# Patient Record
Sex: FEMALE | Race: WHITE | Employment: OTHER | URBAN - METROPOLITAN AREA
[De-identification: names, ages, dates, MRNs, and addresses within clinical notes are randomized per-mention and may not be internally consistent; named-entity substitution may affect disease eponyms.]

---

## 2018-04-23 ENCOUNTER — APPOINTMENT (OUTPATIENT)
Dept: GENERAL RADIOLOGY | Age: 83
DRG: 563 | End: 2018-04-23
Payer: MEDICARE

## 2018-04-23 ENCOUNTER — HOSPITAL ENCOUNTER (INPATIENT)
Age: 83
LOS: 3 days | Discharge: NURSING, REHAB OR LONG-TERM CARE | DRG: 563 | End: 2018-04-26
Attending: EMERGENCY MEDICINE | Admitting: FAMILY MEDICINE
Payer: MEDICARE

## 2018-04-23 ENCOUNTER — APPOINTMENT (OUTPATIENT)
Dept: CT IMAGING | Age: 83
DRG: 563 | End: 2018-04-23
Payer: MEDICARE
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
## 2018-04-23 DIAGNOSIS — T79.6XXA TRAUMATIC RHABDOMYOLYSIS, INITIAL ENCOUNTER (HCC): ICD-10-CM

## 2018-04-23 DIAGNOSIS — S32.010A CLOSED COMPRESSION FRACTURE OF FIRST LUMBAR VERTEBRA, INITIAL ENCOUNTER: ICD-10-CM

## 2018-04-23 LAB
ALBUMIN SERPL-MCNC: 3.7 G/DL (ref 3.5–5.2)
ALP BLD-CCNC: 32 U/L (ref 35–104)
ALT SERPL-CCNC: 21 U/L (ref 0–32)
ANION GAP SERPL CALCULATED.3IONS-SCNC: 11 MMOL/L (ref 7–16)
AST SERPL-CCNC: 63 U/L (ref 0–31)
BACTERIA: NORMAL /HPF
BILIRUB SERPL-MCNC: 1.1 MG/DL (ref 0–1.2)
BILIRUBIN URINE: NEGATIVE
BLOOD, URINE: ABNORMAL
BUN BLDV-MCNC: 25 MG/DL (ref 8–23)
CALCIUM SERPL-MCNC: 8.9 MG/DL (ref 8.6–10.2)
CHLORIDE BLD-SCNC: 101 MMOL/L (ref 98–107)
CLARITY: CLEAR
CO2: 24 MMOL/L (ref 22–29)
COLOR: YELLOW
CREAT SERPL-MCNC: 0.6 MG/DL (ref 0.5–1)
GFR AFRICAN AMERICAN: >60
GFR NON-AFRICAN AMERICAN: >60 ML/MIN/1.73
GLUCOSE BLD-MCNC: 111 MG/DL (ref 74–109)
GLUCOSE URINE: NEGATIVE MG/DL
HCT VFR BLD CALC: 39 % (ref 34–48)
HEMOGLOBIN: 12.6 G/DL (ref 11.5–15.5)
KETONES, URINE: 40 MG/DL
LACTIC ACID: 1.4 MMOL/L (ref 0.5–2.2)
LEUKOCYTE ESTERASE, URINE: NEGATIVE
MCH RBC QN AUTO: 30.7 PG (ref 26–35)
MCHC RBC AUTO-ENTMCNC: 32.3 % (ref 32–34.5)
MCV RBC AUTO: 94.9 FL (ref 80–99.9)
NITRITE, URINE: NEGATIVE
PDW BLD-RTO: 12.5 FL (ref 11.5–15)
PH UA: 7 (ref 5–9)
PLATELET # BLD: 126 E9/L (ref 130–450)
PMV BLD AUTO: 10 FL (ref 7–12)
POTASSIUM SERPL-SCNC: 4.6 MMOL/L (ref 3.5–5)
POTASSIUM SERPL-SCNC: 5.8 MMOL/L (ref 3.5–5)
PROTEIN UA: 30 MG/DL
RBC # BLD: 4.11 E12/L (ref 3.5–5.5)
RBC UA: NORMAL /HPF (ref 0–2)
SODIUM BLD-SCNC: 136 MMOL/L (ref 132–146)
SPECIFIC GRAVITY UA: 1.02 (ref 1–1.03)
TOTAL CK: 723 U/L (ref 20–180)
TOTAL PROTEIN: 6.8 G/DL (ref 6.4–8.3)
TROPONIN: 0.26 NG/ML (ref 0–0.03)
TROPONIN: 0.35 NG/ML (ref 0–0.03)
TROPONIN: 0.41 NG/ML (ref 0–0.03)
UROBILINOGEN, URINE: 0.2 E.U./DL
WBC # BLD: 11.6 E9/L (ref 4.5–11.5)
WBC UA: NORMAL /HPF (ref 0–5)

## 2018-04-23 PROCEDURE — 2580000003 HC RX 258: Performed by: FAMILY MEDICINE

## 2018-04-23 PROCEDURE — 93005 ELECTROCARDIOGRAM TRACING: CPT | Performed by: EMERGENCY MEDICINE

## 2018-04-23 PROCEDURE — 72125 CT NECK SPINE W/O DYE: CPT

## 2018-04-23 PROCEDURE — 94760 N-INVAS EAR/PLS OXIMETRY 1: CPT

## 2018-04-23 PROCEDURE — 2500000003 HC RX 250 WO HCPCS: Performed by: FAMILY MEDICINE

## 2018-04-23 PROCEDURE — 80053 COMPREHEN METABOLIC PANEL: CPT

## 2018-04-23 PROCEDURE — 82550 ASSAY OF CK (CPK): CPT

## 2018-04-23 PROCEDURE — 83605 ASSAY OF LACTIC ACID: CPT

## 2018-04-23 PROCEDURE — 2140000000 HC CCU INTERMEDIATE R&B

## 2018-04-23 PROCEDURE — 71250 CT THORAX DX C-: CPT

## 2018-04-23 PROCEDURE — 70450 CT HEAD/BRAIN W/O DYE: CPT

## 2018-04-23 PROCEDURE — 2580000003 HC RX 258: Performed by: EMERGENCY MEDICINE

## 2018-04-23 PROCEDURE — 99285 EMERGENCY DEPT VISIT HI MDM: CPT

## 2018-04-23 PROCEDURE — 72131 CT LUMBAR SPINE W/O DYE: CPT

## 2018-04-23 PROCEDURE — 6370000000 HC RX 637 (ALT 250 FOR IP): Performed by: EMERGENCY MEDICINE

## 2018-04-23 PROCEDURE — 73564 X-RAY EXAM KNEE 4 OR MORE: CPT

## 2018-04-23 PROCEDURE — 72128 CT CHEST SPINE W/O DYE: CPT

## 2018-04-23 PROCEDURE — 87088 URINE BACTERIA CULTURE: CPT

## 2018-04-23 PROCEDURE — 36415 COLL VENOUS BLD VENIPUNCTURE: CPT

## 2018-04-23 PROCEDURE — 73502 X-RAY EXAM HIP UNI 2-3 VIEWS: CPT

## 2018-04-23 PROCEDURE — 6360000002 HC RX W HCPCS: Performed by: FAMILY MEDICINE

## 2018-04-23 PROCEDURE — 84484 ASSAY OF TROPONIN QUANT: CPT

## 2018-04-23 PROCEDURE — 81001 URINALYSIS AUTO W/SCOPE: CPT

## 2018-04-23 PROCEDURE — 73110 X-RAY EXAM OF WRIST: CPT

## 2018-04-23 PROCEDURE — 74176 CT ABD & PELVIS W/O CONTRAST: CPT

## 2018-04-23 PROCEDURE — 85027 COMPLETE CBC AUTOMATED: CPT

## 2018-04-23 PROCEDURE — 84132 ASSAY OF SERUM POTASSIUM: CPT

## 2018-04-23 RX ORDER — SERTRALINE HYDROCHLORIDE 25 MG/1
12.5 TABLET, FILM COATED ORAL EVERY OTHER DAY
Refills: 5 | COMMUNITY
Start: 2018-01-29

## 2018-04-23 RX ORDER — SODIUM CHLORIDE 0.9 % (FLUSH) 0.9 %
10 SYRINGE (ML) INJECTION PRN
Status: DISCONTINUED | OUTPATIENT
Start: 2018-04-23 | End: 2018-04-26 | Stop reason: HOSPADM

## 2018-04-23 RX ORDER — 0.9 % SODIUM CHLORIDE 0.9 %
500 INTRAVENOUS SOLUTION INTRAVENOUS ONCE
Status: COMPLETED | OUTPATIENT
Start: 2018-04-23 | End: 2018-04-23

## 2018-04-23 RX ORDER — LIDOCAINE 50 MG/G
1 PATCH TOPICAL PRN
Status: DISCONTINUED | OUTPATIENT
Start: 2018-04-23 | End: 2018-04-23

## 2018-04-23 RX ORDER — ALPRAZOLAM 0.25 MG/1
0.25 TABLET ORAL 3 TIMES DAILY PRN
Status: DISCONTINUED | OUTPATIENT
Start: 2018-04-23 | End: 2018-04-26 | Stop reason: HOSPADM

## 2018-04-23 RX ORDER — ACETAMINOPHEN 325 MG/1
650 TABLET ORAL EVERY 4 HOURS PRN
Status: DISCONTINUED | OUTPATIENT
Start: 2018-04-23 | End: 2018-04-23 | Stop reason: SDUPTHER

## 2018-04-23 RX ORDER — HYDROCODONE BITARTRATE AND ACETAMINOPHEN 5; 325 MG/1; MG/1
2 TABLET ORAL EVERY 4 HOURS PRN
Status: DISCONTINUED | OUTPATIENT
Start: 2018-04-23 | End: 2018-04-25

## 2018-04-23 RX ORDER — MECLIZINE HCL 12.5 MG/1
25 TABLET ORAL 3 TIMES DAILY PRN
Status: DISCONTINUED | OUTPATIENT
Start: 2018-04-23 | End: 2018-04-26 | Stop reason: HOSPADM

## 2018-04-23 RX ORDER — SODIUM CHLORIDE 0.9 % (FLUSH) 0.9 %
10 SYRINGE (ML) INJECTION EVERY 12 HOURS SCHEDULED
Status: DISCONTINUED | OUTPATIENT
Start: 2018-04-23 | End: 2018-04-26 | Stop reason: HOSPADM

## 2018-04-23 RX ORDER — ACETAMINOPHEN 325 MG/1
325 TABLET ORAL EVERY 6 HOURS PRN
Status: DISCONTINUED | OUTPATIENT
Start: 2018-04-23 | End: 2018-04-25

## 2018-04-23 RX ORDER — LIDOCAINE 50 MG/G
1 PATCH TOPICAL DAILY PRN
Status: DISCONTINUED | OUTPATIENT
Start: 2018-04-23 | End: 2018-04-26 | Stop reason: HOSPADM

## 2018-04-23 RX ORDER — HYDROCODONE BITARTRATE AND ACETAMINOPHEN 5; 325 MG/1; MG/1
1 TABLET ORAL EVERY 4 HOURS PRN
Status: DISCONTINUED | OUTPATIENT
Start: 2018-04-23 | End: 2018-04-25

## 2018-04-23 RX ORDER — DEXTROSE, SODIUM CHLORIDE, AND POTASSIUM CHLORIDE 5; .45; .15 G/100ML; G/100ML; G/100ML
INJECTION INTRAVENOUS CONTINUOUS
Status: DISCONTINUED | OUTPATIENT
Start: 2018-04-23 | End: 2018-04-26

## 2018-04-23 RX ORDER — ONDANSETRON 2 MG/ML
4 INJECTION INTRAMUSCULAR; INTRAVENOUS EVERY 6 HOURS PRN
Status: DISCONTINUED | OUTPATIENT
Start: 2018-04-23 | End: 2018-04-26 | Stop reason: HOSPADM

## 2018-04-23 RX ORDER — ASPIRIN 325 MG
325 TABLET ORAL ONCE
Status: COMPLETED | OUTPATIENT
Start: 2018-04-23 | End: 2018-04-23

## 2018-04-23 RX ADMIN — SODIUM CHLORIDE 500 ML: 9 INJECTION, SOLUTION INTRAVENOUS at 12:11

## 2018-04-23 RX ADMIN — ENOXAPARIN SODIUM 40 MG: 40 INJECTION SUBCUTANEOUS at 18:22

## 2018-04-23 RX ADMIN — DEXTROSE MONOHYDRATE, SODIUM CHLORIDE, AND POTASSIUM CHLORIDE: 50; 4.5; 1.49 INJECTION, SOLUTION INTRAVENOUS at 18:22

## 2018-04-23 RX ADMIN — ASPIRIN 325 MG: 325 TABLET, COATED ORAL at 15:21

## 2018-04-23 RX ADMIN — SODIUM CHLORIDE 500 ML: 9 INJECTION, SOLUTION INTRAVENOUS at 15:20

## 2018-04-23 ASSESSMENT — PAIN SCALES - GENERAL
PAINLEVEL_OUTOF10: 0
PAINLEVEL_OUTOF10: 0
PAINLEVEL_OUTOF10: 10
PAINLEVEL_OUTOF10: 0

## 2018-04-23 ASSESSMENT — PAIN DESCRIPTION - PAIN TYPE: TYPE: ACUTE PAIN;CHRONIC PAIN

## 2018-04-23 ASSESSMENT — PAIN DESCRIPTION - ORIENTATION: ORIENTATION: LOWER

## 2018-04-23 ASSESSMENT — PAIN DESCRIPTION - LOCATION: LOCATION: BACK

## 2018-04-24 ENCOUNTER — APPOINTMENT (OUTPATIENT)
Dept: MRI IMAGING | Age: 83
DRG: 563 | End: 2018-04-24
Payer: MEDICARE

## 2018-04-24 LAB
ALBUMIN SERPL-MCNC: 2.8 G/DL (ref 3.5–5.2)
ALP BLD-CCNC: 35 U/L (ref 35–104)
ALT SERPL-CCNC: 17 U/L (ref 0–32)
ANION GAP SERPL CALCULATED.3IONS-SCNC: 10 MMOL/L (ref 7–16)
AST SERPL-CCNC: 40 U/L (ref 0–31)
BILIRUB SERPL-MCNC: 0.7 MG/DL (ref 0–1.2)
BUN BLDV-MCNC: 23 MG/DL (ref 8–23)
CALCIUM SERPL-MCNC: 8.1 MG/DL (ref 8.6–10.2)
CHLORIDE BLD-SCNC: 105 MMOL/L (ref 98–107)
CO2: 22 MMOL/L (ref 22–29)
CREAT SERPL-MCNC: 0.6 MG/DL (ref 0.5–1)
GFR AFRICAN AMERICAN: >60
GFR NON-AFRICAN AMERICAN: >60 ML/MIN/1.73
GLUCOSE BLD-MCNC: 101 MG/DL (ref 74–109)
HCT VFR BLD CALC: 32.8 % (ref 34–48)
HEMOGLOBIN: 10.7 G/DL (ref 11.5–15.5)
LV EF: 60 %
LVEF MODALITY: NORMAL
MCH RBC QN AUTO: 31.1 PG (ref 26–35)
MCHC RBC AUTO-ENTMCNC: 32.6 % (ref 32–34.5)
MCV RBC AUTO: 95.3 FL (ref 80–99.9)
PDW BLD-RTO: 12.6 FL (ref 11.5–15)
PLATELET # BLD: 101 E9/L (ref 130–450)
PMV BLD AUTO: 10.6 FL (ref 7–12)
POTASSIUM REFLEX MAGNESIUM: 4 MMOL/L (ref 3.5–5)
RBC # BLD: 3.44 E12/L (ref 3.5–5.5)
SODIUM BLD-SCNC: 137 MMOL/L (ref 132–146)
TOTAL CK: 436 U/L (ref 20–180)
TOTAL PROTEIN: 5.5 G/DL (ref 6.4–8.3)
TROPONIN: 0.33 NG/ML (ref 0–0.03)
WBC # BLD: 9.6 E9/L (ref 4.5–11.5)

## 2018-04-24 PROCEDURE — 80053 COMPREHEN METABOLIC PANEL: CPT

## 2018-04-24 PROCEDURE — 85027 COMPLETE CBC AUTOMATED: CPT

## 2018-04-24 PROCEDURE — 2140000000 HC CCU INTERMEDIATE R&B

## 2018-04-24 PROCEDURE — APPSS45 APP SPLIT SHARED TIME 31-45 MINUTES: Performed by: PHYSICIAN ASSISTANT

## 2018-04-24 PROCEDURE — 93306 TTE W/DOPPLER COMPLETE: CPT

## 2018-04-24 PROCEDURE — 2500000003 HC RX 250 WO HCPCS: Performed by: FAMILY MEDICINE

## 2018-04-24 PROCEDURE — 72148 MRI LUMBAR SPINE W/O DYE: CPT

## 2018-04-24 PROCEDURE — 6370000000 HC RX 637 (ALT 250 FOR IP): Performed by: FAMILY MEDICINE

## 2018-04-24 PROCEDURE — 2580000003 HC RX 258: Performed by: FAMILY MEDICINE

## 2018-04-24 PROCEDURE — 84484 ASSAY OF TROPONIN QUANT: CPT

## 2018-04-24 PROCEDURE — 6360000002 HC RX W HCPCS: Performed by: FAMILY MEDICINE

## 2018-04-24 PROCEDURE — 99223 1ST HOSP IP/OBS HIGH 75: CPT | Performed by: INTERNAL MEDICINE

## 2018-04-24 PROCEDURE — 82550 ASSAY OF CK (CPK): CPT

## 2018-04-24 PROCEDURE — 36415 COLL VENOUS BLD VENIPUNCTURE: CPT

## 2018-04-24 RX ORDER — POLYVINYL ALCOHOL 14 MG/ML
1 SOLUTION/ DROPS OPHTHALMIC EVERY 4 HOURS PRN
Status: DISCONTINUED | OUTPATIENT
Start: 2018-04-24 | End: 2018-04-26 | Stop reason: HOSPADM

## 2018-04-24 RX ORDER — LORAZEPAM 2 MG/ML
0.5 INJECTION INTRAMUSCULAR ONCE
Status: DISCONTINUED | OUTPATIENT
Start: 2018-04-24 | End: 2018-04-26 | Stop reason: HOSPADM

## 2018-04-24 RX ADMIN — DEXTROSE MONOHYDRATE, SODIUM CHLORIDE, AND POTASSIUM CHLORIDE: 50; 4.5; 1.49 INJECTION, SOLUTION INTRAVENOUS at 22:48

## 2018-04-24 RX ADMIN — HYDROCODONE BITARTRATE AND ACETAMINOPHEN 1 TABLET: 5; 325 TABLET ORAL at 18:16

## 2018-04-24 RX ADMIN — ENOXAPARIN SODIUM 40 MG: 40 INJECTION SUBCUTANEOUS at 09:24

## 2018-04-24 RX ADMIN — ACETAMINOPHEN 325 MG: 325 TABLET, FILM COATED ORAL at 09:23

## 2018-04-24 RX ADMIN — ALPRAZOLAM 0.25 MG: 0.25 TABLET ORAL at 09:31

## 2018-04-24 RX ADMIN — Medication 10 ML: at 21:53

## 2018-04-24 RX ADMIN — DEXTROSE MONOHYDRATE, SODIUM CHLORIDE, AND POTASSIUM CHLORIDE: 50; 4.5; 1.49 INJECTION, SOLUTION INTRAVENOUS at 06:16

## 2018-04-24 RX ADMIN — Medication 10 ML: at 09:24

## 2018-04-24 RX ADMIN — Medication 10 ML: at 05:32

## 2018-04-24 ASSESSMENT — PAIN DESCRIPTION - PAIN TYPE: TYPE: ACUTE PAIN

## 2018-04-24 ASSESSMENT — PAIN DESCRIPTION - ORIENTATION: ORIENTATION: LOWER

## 2018-04-24 ASSESSMENT — PAIN DESCRIPTION - DESCRIPTORS: DESCRIPTORS: ACHING

## 2018-04-24 ASSESSMENT — PAIN SCALES - GENERAL
PAINLEVEL_OUTOF10: 5
PAINLEVEL_OUTOF10: 0
PAINLEVEL_OUTOF10: 0
PAINLEVEL_OUTOF10: 8
PAINLEVEL_OUTOF10: 6

## 2018-04-24 ASSESSMENT — PAIN DESCRIPTION - LOCATION: LOCATION: BACK

## 2018-04-25 PROBLEM — S32.010D CLOSED COMPRESSION FRACTURE OF L1 LUMBAR VERTEBRA, WITH ROUTINE HEALING, SUBSEQUENT ENCOUNTER: Chronic | Status: ACTIVE | Noted: 2018-04-23

## 2018-04-25 PROBLEM — S39.012A LUMBOSACRAL STRAIN: Status: ACTIVE | Noted: 2018-04-25

## 2018-04-25 LAB
ANION GAP SERPL CALCULATED.3IONS-SCNC: 9 MMOL/L (ref 7–16)
BUN BLDV-MCNC: 15 MG/DL (ref 8–23)
CALCIUM SERPL-MCNC: 8 MG/DL (ref 8.6–10.2)
CHLORIDE BLD-SCNC: 105 MMOL/L (ref 98–107)
CO2: 23 MMOL/L (ref 22–29)
CREAT SERPL-MCNC: 0.6 MG/DL (ref 0.5–1)
GFR AFRICAN AMERICAN: >60
GFR NON-AFRICAN AMERICAN: >60 ML/MIN/1.73
GLUCOSE BLD-MCNC: 111 MG/DL (ref 74–109)
POTASSIUM SERPL-SCNC: 4 MMOL/L (ref 3.5–5)
SODIUM BLD-SCNC: 137 MMOL/L (ref 132–146)
TOTAL CK: 131 U/L (ref 20–180)
URINE CULTURE, ROUTINE: NORMAL

## 2018-04-25 PROCEDURE — 2700000000 HC OXYGEN THERAPY PER DAY

## 2018-04-25 PROCEDURE — 6370000000 HC RX 637 (ALT 250 FOR IP): Performed by: FAMILY MEDICINE

## 2018-04-25 PROCEDURE — 99232 SBSQ HOSP IP/OBS MODERATE 35: CPT | Performed by: INTERNAL MEDICINE

## 2018-04-25 PROCEDURE — 97162 PT EVAL MOD COMPLEX 30 MIN: CPT

## 2018-04-25 PROCEDURE — G8978 MOBILITY CURRENT STATUS: HCPCS

## 2018-04-25 PROCEDURE — G8987 SELF CARE CURRENT STATUS: HCPCS

## 2018-04-25 PROCEDURE — 97530 THERAPEUTIC ACTIVITIES: CPT

## 2018-04-25 PROCEDURE — 82550 ASSAY OF CK (CPK): CPT

## 2018-04-25 PROCEDURE — 2140000000 HC CCU INTERMEDIATE R&B

## 2018-04-25 PROCEDURE — G8979 MOBILITY GOAL STATUS: HCPCS

## 2018-04-25 PROCEDURE — G8988 SELF CARE GOAL STATUS: HCPCS

## 2018-04-25 PROCEDURE — 80048 BASIC METABOLIC PNL TOTAL CA: CPT

## 2018-04-25 PROCEDURE — 97165 OT EVAL LOW COMPLEX 30 MIN: CPT

## 2018-04-25 PROCEDURE — 36415 COLL VENOUS BLD VENIPUNCTURE: CPT

## 2018-04-25 PROCEDURE — 6360000002 HC RX W HCPCS: Performed by: FAMILY MEDICINE

## 2018-04-25 RX ORDER — ACETAMINOPHEN 325 MG/1
650 TABLET ORAL EVERY 6 HOURS PRN
Status: DISCONTINUED | OUTPATIENT
Start: 2018-04-25 | End: 2018-04-26 | Stop reason: HOSPADM

## 2018-04-25 RX ADMIN — ENOXAPARIN SODIUM 40 MG: 40 INJECTION SUBCUTANEOUS at 08:47

## 2018-04-25 RX ADMIN — ACETAMINOPHEN 650 MG: 325 TABLET, FILM COATED ORAL at 08:49

## 2018-04-25 RX ADMIN — HYDROCODONE BITARTRATE AND ACETAMINOPHEN 1 TABLET: 5; 325 TABLET ORAL at 04:23

## 2018-04-25 RX ADMIN — ACETAMINOPHEN 650 MG: 325 TABLET, FILM COATED ORAL at 16:07

## 2018-04-25 ASSESSMENT — PAIN DESCRIPTION - DESCRIPTORS: DESCRIPTORS: ACHING

## 2018-04-25 ASSESSMENT — PAIN DESCRIPTION - PROGRESSION
CLINICAL_PROGRESSION: GRADUALLY IMPROVING
CLINICAL_PROGRESSION: GRADUALLY IMPROVING

## 2018-04-25 ASSESSMENT — PAIN SCALES - GENERAL
PAINLEVEL_OUTOF10: 6
PAINLEVEL_OUTOF10: 6
PAINLEVEL_OUTOF10: 4
PAINLEVEL_OUTOF10: 6
PAINLEVEL_OUTOF10: 8

## 2018-04-25 ASSESSMENT — PAIN DESCRIPTION - LOCATION: LOCATION: BACK

## 2018-04-25 ASSESSMENT — PAIN DESCRIPTION - ORIENTATION: ORIENTATION: LOWER

## 2018-04-25 ASSESSMENT — PAIN DESCRIPTION - ONSET: ONSET: ON-GOING

## 2018-04-25 ASSESSMENT — PAIN DESCRIPTION - PAIN TYPE: TYPE: ACUTE PAIN

## 2018-04-25 ASSESSMENT — PAIN DESCRIPTION - FREQUENCY: FREQUENCY: INTERMITTENT

## 2018-04-26 VITALS
RESPIRATION RATE: 18 BRPM | TEMPERATURE: 98.2 F | DIASTOLIC BLOOD PRESSURE: 48 MMHG | SYSTOLIC BLOOD PRESSURE: 108 MMHG | WEIGHT: 112.9 LBS | HEART RATE: 80 BPM | OXYGEN SATURATION: 96 % | HEIGHT: 60 IN | BODY MASS INDEX: 22.16 KG/M2

## 2018-04-26 PROCEDURE — 6360000002 HC RX W HCPCS: Performed by: FAMILY MEDICINE

## 2018-04-26 PROCEDURE — 6370000000 HC RX 637 (ALT 250 FOR IP): Performed by: FAMILY MEDICINE

## 2018-04-26 PROCEDURE — L0464 TLSO 4MOD SACRO-SCAP PRE: HCPCS

## 2018-04-26 PROCEDURE — 2500000003 HC RX 250 WO HCPCS: Performed by: FAMILY MEDICINE

## 2018-04-26 PROCEDURE — 97530 THERAPEUTIC ACTIVITIES: CPT

## 2018-04-26 PROCEDURE — 2580000003 HC RX 258: Performed by: FAMILY MEDICINE

## 2018-04-26 RX ORDER — POLYVINYL ALCOHOL 14 MG/ML
1 SOLUTION/ DROPS OPHTHALMIC EVERY 4 HOURS PRN
Qty: 1 BOTTLE | Refills: 4 | Status: SHIPPED | OUTPATIENT
Start: 2018-04-26 | End: 2018-05-26

## 2018-04-26 RX ORDER — ACETAMINOPHEN 325 MG/1
325 TABLET ORAL EVERY 4 HOURS PRN
Qty: 120 TABLET | Refills: 3 | Status: SHIPPED | OUTPATIENT
Start: 2018-04-26

## 2018-04-26 RX ADMIN — ENOXAPARIN SODIUM 40 MG: 40 INJECTION SUBCUTANEOUS at 09:19

## 2018-04-26 RX ADMIN — ACETAMINOPHEN 650 MG: 325 TABLET, FILM COATED ORAL at 03:02

## 2018-04-26 RX ADMIN — DEXTROSE MONOHYDRATE, SODIUM CHLORIDE, AND POTASSIUM CHLORIDE: 50; 4.5; 1.49 INJECTION, SOLUTION INTRAVENOUS at 00:15

## 2018-04-26 RX ADMIN — Medication 10 ML: at 09:19

## 2018-04-26 ASSESSMENT — PAIN SCALES - GENERAL
PAINLEVEL_OUTOF10: 8
PAINLEVEL_OUTOF10: 0

## 2018-04-27 LAB
EKG ATRIAL RATE: 81 BPM
EKG P AXIS: 56 DEGREES
EKG P-R INTERVAL: 106 MS
EKG Q-T INTERVAL: 396 MS
EKG QRS DURATION: 82 MS
EKG QTC CALCULATION (BAZETT): 460 MS
EKG R AXIS: 76 DEGREES
EKG T AXIS: 82 DEGREES
EKG VENTRICULAR RATE: 81 BPM
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
## 2018-07-13 ENCOUNTER — APPOINTMENT (OUTPATIENT)
Dept: GENERAL RADIOLOGY | Age: 83
DRG: 884 | End: 2018-07-13
Payer: MEDICARE

## 2018-07-13 ENCOUNTER — APPOINTMENT (OUTPATIENT)
Dept: CT IMAGING | Age: 83
DRG: 884 | End: 2018-07-13
Payer: MEDICARE

## 2018-07-13 ENCOUNTER — HOSPITAL ENCOUNTER (INPATIENT)
Age: 83
LOS: 7 days | Discharge: NURSING, REHAB OR LONG-TERM CARE | DRG: 884 | End: 2018-07-20
Attending: EMERGENCY MEDICINE | Admitting: FAMILY MEDICINE
Payer: MEDICARE

## 2018-07-13 DIAGNOSIS — R53.83 FATIGUE, UNSPECIFIED TYPE: ICD-10-CM

## 2018-07-13 DIAGNOSIS — R53.1 GENERALIZED WEAKNESS: Primary | ICD-10-CM

## 2018-07-13 LAB
ALBUMIN SERPL-MCNC: 3.3 G/DL (ref 3.5–5.2)
ALP BLD-CCNC: 49 U/L (ref 35–104)
ALT SERPL-CCNC: 11 U/L (ref 0–32)
ANION GAP SERPL CALCULATED.3IONS-SCNC: 10 MMOL/L (ref 7–16)
APTT: 28.5 SEC (ref 24.5–35.1)
AST SERPL-CCNC: 40 U/L (ref 0–31)
BACTERIA: NORMAL /HPF
BASOPHILS ABSOLUTE: 0.02 E9/L (ref 0–0.2)
BASOPHILS RELATIVE PERCENT: 0.2 % (ref 0–2)
BILIRUB SERPL-MCNC: 0.3 MG/DL (ref 0–1.2)
BILIRUBIN URINE: NEGATIVE
BLOOD, URINE: NEGATIVE
BUN BLDV-MCNC: 23 MG/DL (ref 8–23)
CALCIUM SERPL-MCNC: 8.8 MG/DL (ref 8.6–10.2)
CHLORIDE BLD-SCNC: 104 MMOL/L (ref 98–107)
CLARITY: CLEAR
CO2: 25 MMOL/L (ref 22–29)
COLOR: YELLOW
CREAT SERPL-MCNC: 0.6 MG/DL (ref 0.5–1)
EKG ATRIAL RATE: 82 BPM
EKG P AXIS: 42 DEGREES
EKG P-R INTERVAL: 142 MS
EKG Q-T INTERVAL: 300 MS
EKG QRS DURATION: 76 MS
EKG QTC CALCULATION (BAZETT): 350 MS
EKG R AXIS: 21 DEGREES
EKG T AXIS: 74 DEGREES
EKG VENTRICULAR RATE: 82 BPM
EOSINOPHILS ABSOLUTE: 0.1 E9/L (ref 0.05–0.5)
EOSINOPHILS RELATIVE PERCENT: 1.2 % (ref 0–6)
GFR AFRICAN AMERICAN: >60
GFR NON-AFRICAN AMERICAN: >60 ML/MIN/1.73
GLUCOSE BLD-MCNC: 100 MG/DL (ref 74–109)
GLUCOSE URINE: NEGATIVE MG/DL
HCT VFR BLD CALC: 32.9 % (ref 34–48)
HEMOGLOBIN: 11 G/DL (ref 11.5–15.5)
IMMATURE GRANULOCYTES #: 0.05 E9/L
IMMATURE GRANULOCYTES %: 0.6 % (ref 0–5)
INR BLD: 1.1
KETONES, URINE: NEGATIVE MG/DL
LACTIC ACID: 0.8 MMOL/L (ref 0.5–2.2)
LEUKOCYTE ESTERASE, URINE: NEGATIVE
LYMPHOCYTES ABSOLUTE: 1.91 E9/L (ref 1.5–4)
LYMPHOCYTES RELATIVE PERCENT: 22 % (ref 20–42)
MCH RBC QN AUTO: 31.7 PG (ref 26–35)
MCHC RBC AUTO-ENTMCNC: 33.4 % (ref 32–34.5)
MCV RBC AUTO: 94.8 FL (ref 80–99.9)
MONOCYTES ABSOLUTE: 0.95 E9/L (ref 0.1–0.95)
MONOCYTES RELATIVE PERCENT: 11 % (ref 2–12)
NEUTROPHILS ABSOLUTE: 5.64 E9/L (ref 1.8–7.3)
NEUTROPHILS RELATIVE PERCENT: 65 % (ref 43–80)
NITRITE, URINE: NEGATIVE
PDW BLD-RTO: 13.6 FL (ref 11.5–15)
PH UA: 8 (ref 5–9)
PLATELET # BLD: 153 E9/L (ref 130–450)
PMV BLD AUTO: 9.7 FL (ref 7–12)
POTASSIUM SERPL-SCNC: 5.1 MMOL/L (ref 3.5–5)
POTASSIUM SERPL-SCNC: 5.5 MMOL/L (ref 3.5–5)
PRO-BNP: 927 PG/ML (ref 0–450)
PROTEIN UA: NEGATIVE MG/DL
PROTHROMBIN TIME: 12.6 SEC (ref 9.3–12.4)
RBC # BLD: 3.47 E12/L (ref 3.5–5.5)
RBC UA: NORMAL /HPF (ref 0–2)
SODIUM BLD-SCNC: 139 MMOL/L (ref 132–146)
SPECIFIC GRAVITY UA: 1.01 (ref 1–1.03)
TOTAL PROTEIN: 6.8 G/DL (ref 6.4–8.3)
TROPONIN: <0.01 NG/ML (ref 0–0.03)
UROBILINOGEN, URINE: 0.2 E.U./DL
WBC # BLD: 8.7 E9/L (ref 4.5–11.5)
WBC UA: NORMAL /HPF (ref 0–5)

## 2018-07-13 PROCEDURE — 99284 EMERGENCY DEPT VISIT MOD MDM: CPT

## 2018-07-13 PROCEDURE — 85025 COMPLETE CBC W/AUTO DIFF WBC: CPT

## 2018-07-13 PROCEDURE — 83605 ASSAY OF LACTIC ACID: CPT

## 2018-07-13 PROCEDURE — 93005 ELECTROCARDIOGRAM TRACING: CPT | Performed by: EMERGENCY MEDICINE

## 2018-07-13 PROCEDURE — 85610 PROTHROMBIN TIME: CPT

## 2018-07-13 PROCEDURE — 70450 CT HEAD/BRAIN W/O DYE: CPT

## 2018-07-13 PROCEDURE — 85730 THROMBOPLASTIN TIME PARTIAL: CPT

## 2018-07-13 PROCEDURE — 6360000002 HC RX W HCPCS: Performed by: EMERGENCY MEDICINE

## 2018-07-13 PROCEDURE — 36415 COLL VENOUS BLD VENIPUNCTURE: CPT

## 2018-07-13 PROCEDURE — 71045 X-RAY EXAM CHEST 1 VIEW: CPT

## 2018-07-13 PROCEDURE — 84484 ASSAY OF TROPONIN QUANT: CPT

## 2018-07-13 PROCEDURE — 84132 ASSAY OF SERUM POTASSIUM: CPT

## 2018-07-13 PROCEDURE — 96374 THER/PROPH/DIAG INJ IV PUSH: CPT

## 2018-07-13 PROCEDURE — 1200000000 HC SEMI PRIVATE

## 2018-07-13 PROCEDURE — 80053 COMPREHEN METABOLIC PANEL: CPT

## 2018-07-13 PROCEDURE — 2580000003 HC RX 258: Performed by: EMERGENCY MEDICINE

## 2018-07-13 PROCEDURE — 81001 URINALYSIS AUTO W/SCOPE: CPT

## 2018-07-13 PROCEDURE — 83880 ASSAY OF NATRIURETIC PEPTIDE: CPT

## 2018-07-13 RX ORDER — MECLIZINE HCL 12.5 MG/1
25 TABLET ORAL 3 TIMES DAILY PRN
Status: DISCONTINUED | OUTPATIENT
Start: 2018-07-13 | End: 2018-07-20 | Stop reason: HOSPADM

## 2018-07-13 RX ORDER — SODIUM CHLORIDE 0.9 % (FLUSH) 0.9 %
10 SYRINGE (ML) INJECTION EVERY 12 HOURS SCHEDULED
Status: DISCONTINUED | OUTPATIENT
Start: 2018-07-13 | End: 2018-07-13 | Stop reason: SDUPTHER

## 2018-07-13 RX ORDER — MULTIVIT-MIN/IRON/FOLIC ACID/K 18-600-40
1 CAPSULE ORAL DAILY
COMMUNITY

## 2018-07-13 RX ORDER — SODIUM CHLORIDE 0.9 % (FLUSH) 0.9 %
10 SYRINGE (ML) INJECTION EVERY 12 HOURS SCHEDULED
Status: DISCONTINUED | OUTPATIENT
Start: 2018-07-13 | End: 2018-07-20 | Stop reason: HOSPADM

## 2018-07-13 RX ORDER — ESTRADIOL 0.1 MG/G
2 CREAM VAGINAL SEE ADMIN INSTRUCTIONS
COMMUNITY
End: 2019-02-19

## 2018-07-13 RX ORDER — ALPRAZOLAM 0.25 MG/1
0.25 TABLET ORAL 3 TIMES DAILY PRN
Status: DISCONTINUED | OUTPATIENT
Start: 2018-07-13 | End: 2018-07-20 | Stop reason: HOSPADM

## 2018-07-13 RX ORDER — ESTRADIOL 0.1 MG/G
2 CREAM VAGINAL
Status: DISCONTINUED | OUTPATIENT
Start: 2018-07-16 | End: 2018-07-20 | Stop reason: HOSPADM

## 2018-07-13 RX ORDER — ONDANSETRON 2 MG/ML
4 INJECTION INTRAMUSCULAR; INTRAVENOUS EVERY 6 HOURS PRN
Status: DISCONTINUED | OUTPATIENT
Start: 2018-07-13 | End: 2018-07-20 | Stop reason: HOSPADM

## 2018-07-13 RX ORDER — FUROSEMIDE 10 MG/ML
20 INJECTION INTRAMUSCULAR; INTRAVENOUS ONCE
Status: COMPLETED | OUTPATIENT
Start: 2018-07-13 | End: 2018-07-13

## 2018-07-13 RX ORDER — SODIUM CHLORIDE 0.9 % (FLUSH) 0.9 %
10 SYRINGE (ML) INJECTION PRN
Status: DISCONTINUED | OUTPATIENT
Start: 2018-07-13 | End: 2018-07-13 | Stop reason: SDUPTHER

## 2018-07-13 RX ORDER — SODIUM CHLORIDE 0.9 % (FLUSH) 0.9 %
10 SYRINGE (ML) INJECTION PRN
Status: DISCONTINUED | OUTPATIENT
Start: 2018-07-13 | End: 2018-07-20 | Stop reason: HOSPADM

## 2018-07-13 RX ORDER — MEMANTINE HYDROCHLORIDE 5 MG/1
5 TABLET ORAL 2 TIMES DAILY
Status: DISCONTINUED | OUTPATIENT
Start: 2018-07-13 | End: 2018-07-15

## 2018-07-13 RX ORDER — MEMANTINE HYDROCHLORIDE 5 MG/1
5 TABLET ORAL 2 TIMES DAILY
Status: ON HOLD | COMMUNITY
End: 2018-07-18 | Stop reason: HOSPADM

## 2018-07-13 RX ORDER — CHOLECALCIFEROL (VITAMIN D3) 50 MCG
2000 TABLET ORAL DAILY
Status: DISCONTINUED | OUTPATIENT
Start: 2018-07-14 | End: 2018-07-20 | Stop reason: HOSPADM

## 2018-07-13 RX ORDER — ACETAMINOPHEN 325 MG/1
650 TABLET ORAL EVERY 4 HOURS PRN
Status: DISCONTINUED | OUTPATIENT
Start: 2018-07-13 | End: 2018-07-20 | Stop reason: HOSPADM

## 2018-07-13 RX ORDER — ACETAMINOPHEN 325 MG/1
650 TABLET ORAL EVERY 4 HOURS PRN
Status: DISCONTINUED | OUTPATIENT
Start: 2018-07-13 | End: 2018-07-13 | Stop reason: SDUPTHER

## 2018-07-13 RX ORDER — LIDOCAINE 50 MG/G
1 PATCH TOPICAL PRN
Status: DISCONTINUED | OUTPATIENT
Start: 2018-07-13 | End: 2018-07-20 | Stop reason: HOSPADM

## 2018-07-13 RX ADMIN — Medication 10 ML: at 16:45

## 2018-07-13 RX ADMIN — FUROSEMIDE 20 MG: 10 INJECTION, SOLUTION INTRAVENOUS at 18:51

## 2018-07-13 ASSESSMENT — ENCOUNTER SYMPTOMS
VOMITING: 0
SHORTNESS OF BREATH: 0
WHEEZING: 0
NAUSEA: 0
BACK PAIN: 0
BLOOD IN STOOL: 0
ABDOMINAL PAIN: 0
DIARRHEA: 0
COUGH: 0

## 2018-07-13 NOTE — ED PROVIDER NOTES
29-year-old female with history of TIA and possibly Parkinson's disease and hip injuries presents to the ED with chief complaint of worsening weakness. A couple of weeks ago patient had neurologic deficit on the left. Son was told by home health aide that patient had weakness on that side. Patient was never checked out in hospital. Spoke with PCP who said patient may have had a TIA. Patient has home health 3 times a day. Per son, patient has been more and more weak although the patient denies this and states \"I feel fine\". Patient has a history of Alzheimer's. Has an appointment set up with neurology for possible Parkinson's. Patient more and more weak. Son feels she needs permenant placement but patient \"feels fine\". Pt has alzherimers and most likely parkinons. Unable, with two people, to move from the car today. Normally walks with walker but could hasnt been able to walk for a few days due to worsening weakness. The history is provided by the patient. Fatigue   Severity:  Mild  Onset quality:  Gradual  Timing:  Constant  Progression:  Worsening  Chronicity:  Recurrent  Context: not change in medication, not increased activity, not recent infection and not urinary tract infection    Relieved by:  Nothing  Worsened by:  Nothing  Ineffective treatments:  None tried  Associated symptoms: difficulty walking    Associated symptoms: no abdominal pain, no anorexia, no ataxia, no chest pain, no cough, no diarrhea, no dizziness, no dysuria, no numbness in extremities, no falls, no fever, no frequency, no headaches, no nausea, no near-syncope, no shortness of breath and no vomiting        Review of Systems   Constitutional: Positive for fatigue. Negative for chills and fever. Ambulatory difficulty   Respiratory: Negative for cough, shortness of breath and wheezing. Cardiovascular: Negative for chest pain and near-syncope.    Gastrointestinal: Negative for abdominal pain, anorexia, blood in stool, mmol/L    Potassium 5.5 (H) 3.5 - 5.0 mmol/L    Chloride 104 98 - 107 mmol/L    CO2 25 22 - 29 mmol/L    Anion Gap 10 7 - 16 mmol/L    Glucose 100 74 - 109 mg/dL    BUN 23 8 - 23 mg/dL    CREATININE 0.6 0.5 - 1.0 mg/dL    GFR Non-African American >60 >=60 mL/min/1.73    GFR African American >60     Calcium 8.8 8.6 - 10.2 mg/dL    Total Protein 6.8 6.4 - 8.3 g/dL    Alb 3.3 (L) 3.5 - 5.2 g/dL    Total Bilirubin 0.3 0.0 - 1.2 mg/dL    Alkaline Phosphatase 49 35 - 104 U/L    ALT 11 0 - 32 U/L    AST 40 (H) 0 - 31 U/L   Troponin   Result Value Ref Range    Troponin <0.01 0.00 - 0.03 ng/mL   Urinalysis with Microscopic   Result Value Ref Range    Color, UA Yellow Straw/Yellow    Clarity, UA Clear Clear    Glucose, Ur Negative Negative mg/dL    Bilirubin Urine Negative Negative    Ketones, Urine Negative Negative mg/dL    Specific Gravity, UA 1.010 1.005 - 1.030    Blood, Urine Negative Negative    pH, UA 8.0 5.0 - 9.0    Protein, UA Negative Negative mg/dL    Urobilinogen, Urine 0.2 <2.0 E.U./dL    Nitrite, Urine Negative Negative    Leukocyte Esterase, Urine Negative Negative    WBC, UA NONE 0 - 5 /HPF    RBC, UA NONE 0 - 2 /HPF    Bacteria, UA NONE /HPF   Protime-INR   Result Value Ref Range    Protime 12.6 (H) 9.3 - 12.4 sec    INR 1.1    APTT   Result Value Ref Range    aPTT 28.5 24.5 - 35.1 sec   Lactic Acid, Plasma   Result Value Ref Range    Lactic Acid 0.8 0.5 - 2.2 mmol/L   Brain Natriuretic Peptide   Result Value Ref Range    Pro- (H) 0 - 450 pg/mL   Potassium   Result Value Ref Range    Potassium 5.1 (H) 3.5 - 5.0 mmol/L   EKG 12 Lead   Result Value Ref Range    Ventricular Rate 82 BPM    Atrial Rate 82 BPM    P-R Interval 142 ms    QRS Duration 76 ms    Q-T Interval 300 ms    QTc Calculation (Bazett) 350 ms    P Axis 42 degrees    R Axis 21 degrees    T Axis 74 degrees       RADIOLOGY:  CT Head WO Contrast   Final Result   1. No acute intracranial hemorrhage, midline shift, or mass effect.    2. Mild to live at home by herself even with 3 times a day home health care. Feels that she needs constant help at this point. Patient thinks the year is 1. Son states that time awareness for the patient has gradually worsened.  Spoke hospitalist who accepts the patient for admission.  [BM]      ED Course User Index  [BM] Anahi Stewart 66, DO  Resident  07/13/18 9421

## 2018-07-14 LAB
ANION GAP SERPL CALCULATED.3IONS-SCNC: 12 MMOL/L (ref 7–16)
BUN BLDV-MCNC: 18 MG/DL (ref 8–23)
CALCIUM SERPL-MCNC: 8.9 MG/DL (ref 8.6–10.2)
CHLORIDE BLD-SCNC: 102 MMOL/L (ref 98–107)
CO2: 27 MMOL/L (ref 22–29)
CREAT SERPL-MCNC: 0.6 MG/DL (ref 0.5–1)
GFR AFRICAN AMERICAN: >60
GFR NON-AFRICAN AMERICAN: >60 ML/MIN/1.73
GLUCOSE BLD-MCNC: 88 MG/DL (ref 74–109)
HCT VFR BLD CALC: 35.7 % (ref 34–48)
HEMOGLOBIN: 11.6 G/DL (ref 11.5–15.5)
LACTIC ACID: 0.6 MMOL/L (ref 0.5–2.2)
MCH RBC QN AUTO: 31.1 PG (ref 26–35)
MCHC RBC AUTO-ENTMCNC: 32.5 % (ref 32–34.5)
MCV RBC AUTO: 95.7 FL (ref 80–99.9)
PDW BLD-RTO: 13.3 FL (ref 11.5–15)
PLATELET # BLD: 170 E9/L (ref 130–450)
PMV BLD AUTO: 9.8 FL (ref 7–12)
POTASSIUM REFLEX MAGNESIUM: 3.7 MMOL/L (ref 3.5–5)
RBC # BLD: 3.73 E12/L (ref 3.5–5.5)
SODIUM BLD-SCNC: 141 MMOL/L (ref 132–146)
TSH SERPL DL<=0.05 MIU/L-ACNC: 0.85 UIU/ML (ref 0.27–4.2)
VITAMIN B-12: 623 PG/ML (ref 211–946)
WBC # BLD: 8.8 E9/L (ref 4.5–11.5)

## 2018-07-14 PROCEDURE — 1200000000 HC SEMI PRIVATE

## 2018-07-14 PROCEDURE — 2580000003 HC RX 258: Performed by: FAMILY MEDICINE

## 2018-07-14 PROCEDURE — 82607 VITAMIN B-12: CPT

## 2018-07-14 PROCEDURE — 87040 BLOOD CULTURE FOR BACTERIA: CPT

## 2018-07-14 PROCEDURE — 36415 COLL VENOUS BLD VENIPUNCTURE: CPT

## 2018-07-14 PROCEDURE — 83605 ASSAY OF LACTIC ACID: CPT

## 2018-07-14 PROCEDURE — 85027 COMPLETE CBC AUTOMATED: CPT

## 2018-07-14 PROCEDURE — 6370000000 HC RX 637 (ALT 250 FOR IP): Performed by: FAMILY MEDICINE

## 2018-07-14 PROCEDURE — 80048 BASIC METABOLIC PNL TOTAL CA: CPT

## 2018-07-14 PROCEDURE — 84443 ASSAY THYROID STIM HORMONE: CPT

## 2018-07-14 RX ORDER — POLYVINYL ALCOHOL 14 MG/ML
1 SOLUTION/ DROPS OPHTHALMIC 3 TIMES DAILY PRN
Status: DISCONTINUED | OUTPATIENT
Start: 2018-07-14 | End: 2018-07-20 | Stop reason: HOSPADM

## 2018-07-14 RX ADMIN — Medication 10 ML: at 10:28

## 2018-07-14 RX ADMIN — Medication 10 ML: at 00:27

## 2018-07-14 RX ADMIN — Medication 2000 UNITS: at 10:28

## 2018-07-14 RX ADMIN — Medication 10 ML: at 22:03

## 2018-07-14 RX ADMIN — MEMANTINE HYDROCHLORIDE 5 MG: 5 TABLET, FILM COATED ORAL at 10:27

## 2018-07-14 RX ADMIN — SERTRALINE 12.5 MG: 50 TABLET, FILM COATED ORAL at 10:27

## 2018-07-14 ASSESSMENT — PAIN SCALES - GENERAL
PAINLEVEL_OUTOF10: 0

## 2018-07-14 NOTE — ED NOTES
Called report to [facility] for pt to go to [id number]. Telemetry monitor requested.      Esvin Khan RN  07/13/18 2007

## 2018-07-14 NOTE — H&P
79 yo female was brought into er because of increased fatigue, weakness, difficulty ambulating, by history she has some dementia, walks with a walker, hx anxiety, depression, autoimmune disease, questionable sjogrens, pt very poor historiam,. Just wants to go home, family working on placement, positive tremors, pt states she had hip problems plus has seen a neurologist in the past    No current facility-administered medications on file prior to encounter. Current Outpatient Prescriptions on File Prior to Encounter   Medication Sig Dispense Refill    acetaminophen (TYLENOL) 325 MG tablet Take 1 tablet by mouth every 4 hours as needed for Pain 120 tablet 3    sertraline (ZOLOFT) 25 MG tablet Take 12.5 mg by mouth every other day  5    meclizine (ANTIVERT) 25 MG tablet Take 25 mg by mouth 3 times daily as needed.  ALPRAZolam (XANAX) 0.25 MG tablet Take 0.25 mg by mouth 3 times daily as needed.  lidocaine (LIDODERM) 5 % Place 1 patch onto the skin as needed.  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,at hands/feet    Chronic back pain     Dementia     [place] (hard of hearing)     uses aide bilateral    Nausea & vomiting     extreme nausea    Raynaud's disease     Rheumatic fever     as a child, , no definite diagnisis of cardiac complications    Wears dentures     wears a tempory  partial top       Past Surgical History:   Procedure Laterality Date    CYST REMOVAL  1950's    from back and from fingers    EYE SURGERY  11/12    right cataract extraction with IOL implant    EYE SURGERY  1/10/2013    left cataract extraction with IOL implant    JOINT REPLACEMENT  x3, last one 2000    left hip,

## 2018-07-15 LAB
BACTERIA: NORMAL /HPF
BILIRUBIN URINE: NEGATIVE
BLOOD, URINE: NEGATIVE
CLARITY: CLEAR
COLOR: YELLOW
FILM ARRAY ADENOVIRUS: NORMAL
FILM ARRAY BORDETELLA PERTUSSIS: NORMAL
FILM ARRAY CHLAMYDOPHILIA PNEUMONIAE: NORMAL
FILM ARRAY CORONAVIRUS 229E: NORMAL
FILM ARRAY CORONAVIRUS HKU1: NORMAL
FILM ARRAY CORONAVIRUS NL63: NORMAL
FILM ARRAY CORONAVIRUS OC43: NORMAL
FILM ARRAY INFLUENZA A VIRUS 09H1: NORMAL
FILM ARRAY INFLUENZA A VIRUS H1: NORMAL
FILM ARRAY INFLUENZA A VIRUS H3: NORMAL
FILM ARRAY INFLUENZA A VIRUS: NORMAL
FILM ARRAY INFLUENZA B: NORMAL
FILM ARRAY METAPNEUMOVIRUS: NORMAL
FILM ARRAY MYCOPLASMA PNEUMONIAE: NORMAL
FILM ARRAY PARAINFLUENZA VIRUS 1: NORMAL
FILM ARRAY PARAINFLUENZA VIRUS 2: NORMAL
FILM ARRAY PARAINFLUENZA VIRUS 3: NORMAL
FILM ARRAY PARAINFLUENZA VIRUS 4: NORMAL
FILM ARRAY RESPIRATORY SYNCITIAL VIRUS: NORMAL
FILM ARRAY RHINOVIRUS/ENTEROVIRUS: NORMAL
GLUCOSE URINE: NEGATIVE MG/DL
HCT VFR BLD CALC: 33.8 % (ref 34–48)
HEMOGLOBIN: 11.1 G/DL (ref 11.5–15.5)
KETONES, URINE: 15 MG/DL
LEUKOCYTE ESTERASE, URINE: NEGATIVE
MCH RBC QN AUTO: 31.2 PG (ref 26–35)
MCHC RBC AUTO-ENTMCNC: 32.8 % (ref 32–34.5)
MCV RBC AUTO: 94.9 FL (ref 80–99.9)
NITRITE, URINE: NEGATIVE
PDW BLD-RTO: 13.2 FL (ref 11.5–15)
PH UA: 6 (ref 5–9)
PLATELET # BLD: 161 E9/L (ref 130–450)
PMV BLD AUTO: 9.6 FL (ref 7–12)
PROTEIN UA: ABNORMAL MG/DL
RBC # BLD: 3.56 E12/L (ref 3.5–5.5)
RBC UA: NORMAL /HPF (ref 0–2)
SPECIFIC GRAVITY UA: >=1.03 (ref 1–1.03)
UROBILINOGEN, URINE: 0.2 E.U./DL
WBC # BLD: 11.9 E9/L (ref 4.5–11.5)
WBC UA: NORMAL /HPF (ref 0–5)

## 2018-07-15 PROCEDURE — 87486 CHLMYD PNEUM DNA AMP PROBE: CPT

## 2018-07-15 PROCEDURE — 6360000002 HC RX W HCPCS: Performed by: FAMILY MEDICINE

## 2018-07-15 PROCEDURE — 87798 DETECT AGENT NOS DNA AMP: CPT

## 2018-07-15 PROCEDURE — 87503 INFLUENZA DNA AMP PROB ADDL: CPT

## 2018-07-15 PROCEDURE — 2580000003 HC RX 258: Performed by: FAMILY MEDICINE

## 2018-07-15 PROCEDURE — 1200000000 HC SEMI PRIVATE

## 2018-07-15 PROCEDURE — 6370000000 HC RX 637 (ALT 250 FOR IP): Performed by: FAMILY MEDICINE

## 2018-07-15 PROCEDURE — 99222 1ST HOSP IP/OBS MODERATE 55: CPT | Performed by: PSYCHIATRY & NEUROLOGY

## 2018-07-15 PROCEDURE — 85027 COMPLETE CBC AUTOMATED: CPT

## 2018-07-15 PROCEDURE — 81001 URINALYSIS AUTO W/SCOPE: CPT

## 2018-07-15 PROCEDURE — 87581 M.PNEUMON DNA AMP PROBE: CPT

## 2018-07-15 PROCEDURE — 36415 COLL VENOUS BLD VENIPUNCTURE: CPT

## 2018-07-15 PROCEDURE — 87502 INFLUENZA DNA AMP PROBE: CPT

## 2018-07-15 RX ORDER — CEPHALEXIN 250 MG/1
250 CAPSULE ORAL EVERY 6 HOURS SCHEDULED
Status: DISCONTINUED | OUTPATIENT
Start: 2018-07-15 | End: 2018-07-20 | Stop reason: HOSPADM

## 2018-07-15 RX ORDER — SODIUM CHLORIDE 450 MG/100ML
INJECTION, SOLUTION INTRAVENOUS CONTINUOUS
Status: DISCONTINUED | OUTPATIENT
Start: 2018-07-15 | End: 2018-07-16

## 2018-07-15 RX ADMIN — ENOXAPARIN SODIUM 40 MG: 40 INJECTION SUBCUTANEOUS at 09:49

## 2018-07-15 RX ADMIN — MEMANTINE HYDROCHLORIDE 5 MG: 5 TABLET, FILM COATED ORAL at 09:48

## 2018-07-15 RX ADMIN — CEPHALEXIN 250 MG: 250 CAPSULE ORAL at 18:30

## 2018-07-15 RX ADMIN — CEPHALEXIN 250 MG: 250 CAPSULE ORAL at 13:46

## 2018-07-15 RX ADMIN — SODIUM CHLORIDE: 4.5 INJECTION, SOLUTION INTRAVENOUS at 10:03

## 2018-07-15 RX ADMIN — Medication 2000 UNITS: at 09:48

## 2018-07-15 RX ADMIN — ACETAMINOPHEN 650 MG: 325 TABLET, FILM COATED ORAL at 09:48

## 2018-07-15 RX ADMIN — Medication 10 ML: at 09:49

## 2018-07-15 ASSESSMENT — PAIN SCALES - GENERAL
PAINLEVEL_OUTOF10: 0

## 2018-07-15 NOTE — CONSULTS
In upper extremities. 3/5 in RLE and 2/5 in LLE. DTRs: +0-1 biceps/triceps/BR/Knees, 0 ankles, plantars UP B/L. Coordination: intact F-N   Gait: Was not tested      Assessement:  Gait problem since years ago with aggravation in recent months  Subtle PD rest tremor 5 Hz with no rigidity  No clear sign of dementia     - Please obtain a brain and cervical MRI without contrast for investigating her gait as her plantar reflexes are both UP.  - Memantine was discontinued. - The Parkinson's disease meds can be started in future in outpatient setting when she is a her baseline. Right now starting of this med while hospitalized will make her more confused. Even as outpatient there should be another discussion with family that how much they want to get rid of this subtle tremor vs adding another medication with its side effects.

## 2018-07-15 NOTE — PROGRESS NOTES
Stephen Wolfe is a 80 y.o. female patient. Current Facility-Administered Medications   Medication Dose Route Frequency Provider Last Rate Last Dose    polyvinyl alcohol (LIQUIFILM TEARS) 1.4 % ophthalmic solution 1 drop  1 drop Both Eyes TID PRN Awa Space, DO        sodium chloride flush 0.9 % injection 10 mL  10 mL Intravenous 2 times per day Awa Space, DO   10 mL at 07/14/18 2203    sodium chloride flush 0.9 % injection 10 mL  10 mL Intravenous PRN Awa Space, DO        acetaminophen (TYLENOL) tablet 650 mg  650 mg Oral Q4H PRN Awa Space, DO        enoxaparin (LOVENOX) injection 40 mg  40 mg Subcutaneous Daily Awa Space, DO        ALPRAZolam Amanda Settles) tablet 0.25 mg  0.25 mg Oral TID PRN Awa Space, DO        vitamin D tablet 2,000 Units  2,000 Units Oral Daily Awa Space, DO   2,000 Units at 07/14/18 1028    [START ON 7/16/2018] estradiol (ESTRACE) vaginal cream 2 g  2 g Vaginal Once per day on Mon Carlton Chao, DO        lidocaine (LIDODERM) 5 % 1 patch  1 patch Transdermal PRN Awa Space, DO        meclizine (ANTIVERT) tablet 25 mg  25 mg Oral TID PRN Awa Space, DO        memantine [facility]) tablet 5 mg  5 mg Oral BID Awa Space, DO   5 mg at 07/14/18 1027    sertraline (ZOLOFT) tablet 12.5 mg  12.5 mg Oral Every Other Day Awa Space, DO   12.5 mg at 07/14/18 1027    magnesium hydroxide (MILK OF MAGNESIA) 400 MG/5ML suspension 30 mL  30 mL Oral Daily PRN Awa Space, DO        ondansetron [facility]) injection 4 mg  4 mg Intravenous Q6H PRN Awa Space, DO         Allergies   Allergen Reactions    Sulfa Antibiotics Hives, Shortness Of Breath and Other (See Comments)     Throat closes shut    Clindamycin/Lincomycin     Lactose Intolerance (Gi)     Macrolides And Ketolides     Penicillins      Active Problems:    Other fatigue    Weakness  Resolved Problems:    * No resolved hospital problems.  *    Blood pressure 138/60,

## 2018-07-15 NOTE — CONSULTS
S: Pt. Presents with generalized weakness and fatigue  Pt's nurse states she has an elevated WBC count and morning temp of 100.6. Family told nurse that she is supposed to see her dentist on Monday. Family was not present during exam, pt. poor historian. Denies any tooth pain, no pain on palpation of alveolar ridges. O: 80 yr old female, history of Alzheimer's   Allergies: Sulfa, clindamycin/lincomycin, macrolides, ketolides, penicillins, lactose intolerance. A: Pt. Wears adalberto. denture but has multiple root tips remaining on lower right and #20 still present. Partially edentulous on maxillary. No apparent clinical swelling, abscess or sinus tract present. Pt. Denies tooth pain or discomfort during exam.     P: Pt. to follow up with outside dentist upon discharge. Can be transferred to dental clinic if needed while admitted.

## 2018-07-16 ENCOUNTER — APPOINTMENT (OUTPATIENT)
Dept: MRI IMAGING | Age: 83
DRG: 884 | End: 2018-07-16
Payer: MEDICARE

## 2018-07-16 LAB
ALBUMIN SERPL-MCNC: 3.2 G/DL (ref 3.5–5.2)
ALP BLD-CCNC: 55 U/L (ref 35–104)
ALT SERPL-CCNC: 8 U/L (ref 0–32)
ANION GAP SERPL CALCULATED.3IONS-SCNC: 11 MMOL/L (ref 7–16)
AST SERPL-CCNC: 14 U/L (ref 0–31)
BILIRUB SERPL-MCNC: 0.7 MG/DL (ref 0–1.2)
BUN BLDV-MCNC: 21 MG/DL (ref 8–23)
CALCIUM SERPL-MCNC: 8.9 MG/DL (ref 8.6–10.2)
CHLORIDE BLD-SCNC: 101 MMOL/L (ref 98–107)
CO2: 25 MMOL/L (ref 22–29)
CREAT SERPL-MCNC: 0.6 MG/DL (ref 0.5–1)
GFR AFRICAN AMERICAN: >60
GFR NON-AFRICAN AMERICAN: >60 ML/MIN/1.73
GLUCOSE BLD-MCNC: 109 MG/DL (ref 74–109)
HCT VFR BLD CALC: 35.5 % (ref 34–48)
HEMOGLOBIN: 11.6 G/DL (ref 11.5–15.5)
MCH RBC QN AUTO: 31.1 PG (ref 26–35)
MCHC RBC AUTO-ENTMCNC: 32.7 % (ref 32–34.5)
MCV RBC AUTO: 95.2 FL (ref 80–99.9)
PDW BLD-RTO: 12.8 FL (ref 11.5–15)
PLATELET # BLD: 161 E9/L (ref 130–450)
PMV BLD AUTO: 9.9 FL (ref 7–12)
POTASSIUM SERPL-SCNC: 3.8 MMOL/L (ref 3.5–5)
RBC # BLD: 3.73 E12/L (ref 3.5–5.5)
SODIUM BLD-SCNC: 137 MMOL/L (ref 132–146)
TOTAL PROTEIN: 6.8 G/DL (ref 6.4–8.3)
WBC # BLD: 12.4 E9/L (ref 4.5–11.5)

## 2018-07-16 PROCEDURE — 36415 COLL VENOUS BLD VENIPUNCTURE: CPT

## 2018-07-16 PROCEDURE — 2580000003 HC RX 258: Performed by: FAMILY MEDICINE

## 2018-07-16 PROCEDURE — 1200000000 HC SEMI PRIVATE

## 2018-07-16 PROCEDURE — 80053 COMPREHEN METABOLIC PANEL: CPT

## 2018-07-16 PROCEDURE — 6370000000 HC RX 637 (ALT 250 FOR IP): Performed by: FAMILY MEDICINE

## 2018-07-16 PROCEDURE — 85027 COMPLETE CBC AUTOMATED: CPT

## 2018-07-16 PROCEDURE — 72141 MRI NECK SPINE W/O DYE: CPT

## 2018-07-16 PROCEDURE — 99233 SBSQ HOSP IP/OBS HIGH 50: CPT | Performed by: NURSE PRACTITIONER

## 2018-07-16 PROCEDURE — 6360000002 HC RX W HCPCS: Performed by: FAMILY MEDICINE

## 2018-07-16 PROCEDURE — 70551 MRI BRAIN STEM W/O DYE: CPT

## 2018-07-16 RX ADMIN — Medication 10 ML: at 09:49

## 2018-07-16 RX ADMIN — POLYVINYL ALCOHOL 1 DROP: 14 SOLUTION/ DROPS OPHTHALMIC at 09:51

## 2018-07-16 RX ADMIN — ACETAMINOPHEN 650 MG: 325 TABLET, FILM COATED ORAL at 09:51

## 2018-07-16 RX ADMIN — CEPHALEXIN 250 MG: 250 CAPSULE ORAL at 00:34

## 2018-07-16 RX ADMIN — CEPHALEXIN 250 MG: 250 CAPSULE ORAL at 11:42

## 2018-07-16 RX ADMIN — ACETAMINOPHEN 650 MG: 325 TABLET, FILM COATED ORAL at 00:34

## 2018-07-16 RX ADMIN — SERTRALINE 12.5 MG: 50 TABLET, FILM COATED ORAL at 09:48

## 2018-07-16 RX ADMIN — CEPHALEXIN 250 MG: 250 CAPSULE ORAL at 17:47

## 2018-07-16 RX ADMIN — Medication 10 ML: at 20:45

## 2018-07-16 RX ADMIN — POLYVINYL ALCOHOL 1 DROP: 14 SOLUTION/ DROPS OPHTHALMIC at 20:45

## 2018-07-16 RX ADMIN — Medication 2000 UNITS: at 09:48

## 2018-07-16 RX ADMIN — ENOXAPARIN SODIUM 40 MG: 40 INJECTION SUBCUTANEOUS at 09:48

## 2018-07-16 ASSESSMENT — PAIN SCALES - GENERAL
PAINLEVEL_OUTOF10: 0
PAINLEVEL_OUTOF10: 0
PAINLEVEL_OUTOF10: 3
PAINLEVEL_OUTOF10: 4
PAINLEVEL_OUTOF10: 2

## 2018-07-16 ASSESSMENT — PAIN DESCRIPTION - LOCATION
LOCATION: NECK
LOCATION: SHOULDER

## 2018-07-16 ASSESSMENT — PAIN DESCRIPTION - ORIENTATION
ORIENTATION: RIGHT;LEFT
ORIENTATION: RIGHT

## 2018-07-16 ASSESSMENT — PAIN DESCRIPTION - DESCRIPTORS: DESCRIPTORS: ACHING;DISCOMFORT

## 2018-07-16 ASSESSMENT — PAIN DESCRIPTION - PAIN TYPE
TYPE: ACUTE PAIN
TYPE: ACUTE PAIN

## 2018-07-16 ASSESSMENT — PAIN DESCRIPTION - ONSET: ONSET: ON-GOING

## 2018-07-16 NOTE — PROGRESS NOTES
LABALBU 3.3 (L) 07/13/2018     Lab Results   Component Value Date    ALT 11 07/13/2018    AST 40 (H) 07/13/2018    ALKPHOS 49 07/13/2018    BILITOT 0.3 07/13/2018         Assessment:     Dementia  Probable early Parkinson's Disease  Fever, probably from dental abscess  Inability to ambulate independently and inability to care for herself at home    Plan:     PT and OT evaluations to advise if patient is appropriate for rehab or ECF placement. Continue to observe progress of fever, will consult with ID if fever recurs.

## 2018-07-16 NOTE — CONSULTS
medications    Medication Sig Start Date End Date Taking? Authorizing Provider   estradiol (ESTRACE) 0.1 MG/GM vaginal cream Place 2 g vaginally See Admin Instructions Once per week   Yes Historical Provider, MD   Cholecalciferol (VITAMIN D) 2000 units CAPS capsule Take 1 capsule by mouth daily   Yes Historical Provider, MD   acetaminophen (TYLENOL) 325 MG tablet Take 1 tablet by mouth every 4 hours as needed for Pain 4/26/18  Yes Johnnie Umana MD   sertraline (ZOLOFT) 25 MG tablet Take 12.5 mg by mouth every other day 1/29/18  Yes Historical Provider, MD   meclizine (ANTIVERT) 25 MG tablet Take 25 mg by mouth 3 times daily as needed. Yes Historical Provider, MD   ALPRAZolam (XANAX) 0.25 MG tablet Take 0.25 mg by mouth 3 times daily as needed. Yes Historical Provider, MD   lidocaine (LIDODERM) 5 % Place 1 patch onto the skin as needed. 12 hours on, 12 hours off as needs for lower back pain    Yes Historical Provider, MD   memantine (NAMENDA) 5 MG tablet Take 5 mg by mouth 2 times daily    Historical Provider, MD       Allergies:  Sulfa antibiotics; Clindamycin/lincomycin; Lactose intolerance (gi); Macrolides and ketolides; and Penicillins    Social History:   TOBACCO:   reports that she has never smoked. She has never used smokeless tobacco.  ETOH:   reports that she drinks alcohol. Family History:   History reviewed. No pertinent family history. Dental:   Patient is negative for pain, mandibular ROM normal. Examined patient intraorally, and patient has multiple retained root tips on right side w/ a denture fabricated over it. Patient had root tips extracted on left side 2 months ago when they became infected. Patient was scheduled for prophylactic extractions of remaining root tips w/ Dr. Therese Katz.      Physical Exam:    /72   Pulse 78   Temp 99.4 °F (37.4 °C) (Temporal)   Resp 16   Ht 5' (1.524 m)   Wt 112 lb (50.8 kg)   SpO2 98%   BMI 21.87 kg/m²     General Appearance: cooperative    Dental:   Patient is negative for pain, mandibular ROM normal. Examined patient intraorally, and patient has multiple retained root tips (root banking) on right side w/ a denture fabricated over it. Patient had root tips extracted on left side 2 months ago when they became symptomatic and abscessed. Patient was scheduled for prophylactic extractions of remaining root tips w/ Dr. Kayode Barton today at his office. Hygiene: mucous membranes moist, pharynx normal without lesions and dental hygiene poor  Dentition: poor  Teeth: caries: noted on root tips on right side  Retained roots 4  Gingiva: swollen and inflamed  Floor of mouth: within an acceptable range  Teeth - Partial Edentulism      Labs:  CBC with Differential:    Lab Results   Component Value Date    WBC 12.4 07/16/2018    RBC 3.73 07/16/2018    HGB 11.6 07/16/2018    HCT 35.5 07/16/2018     07/16/2018    MCV 95.2 07/16/2018    MCH 31.1 07/16/2018    MCHC 32.7 07/16/2018    RDW 12.8 07/16/2018    LYMPHOPCT 22.0 07/13/2018    MONOPCT 11.0 07/13/2018    BASOPCT 0.2 07/13/2018    MONOSABS 0.95 07/13/2018    LYMPHSABS 1.91 07/13/2018    EOSABS 0.10 07/13/2018    BASOSABS 0.02 07/13/2018       Imaging:   Took multiple PA's from midline to #29 area - r and r. No signs of periapical radiolucencies indicating dental abscess. Possible idiopathic salivary gland stones seen in radiographs, can assess further with pan. Need dental panoramic radiograph in dental clinic. Assessment and Plan   Upon clinical evaluation and radiographs taken, unable to clinically see apparent signs of dental infection, however RTs may be extracted to rule out odontogenic source for infection. Recommend Patient to come to dental clinic 7/17 at 9:00am for Panorex and possible dental extractions of RTs. Plan:  1) Have patient transferred to dental clinic in the a.m.   2) Get more radiographs including dental Panorex - odontogenic and possible salivary gland stones    3) Extract remaining root tips      Follow-up after Discharge:  2 weeks. Call [phone] (Dental ambulatory clinic) for follow up appointment. Case d/w Dr. Nyla Bolivar DDS  5:16 PM  7/16/2018     Pt was examined by attending Dr. Paul Her. I personally reviewed radiographs with the above resident and agree with the assessment and plan.    Electronically signed by Ching Leblanc DDS on 7/16/2018 at 11:22 PM

## 2018-07-17 PROBLEM — M48.02 CERVICAL STENOSIS OF SPINAL CANAL: Status: ACTIVE | Noted: 2018-07-17

## 2018-07-17 LAB
HCT VFR BLD CALC: 31.4 % (ref 34–48)
HEMOGLOBIN: 10.7 G/DL (ref 11.5–15.5)
MCH RBC QN AUTO: 31.9 PG (ref 26–35)
MCHC RBC AUTO-ENTMCNC: 34.1 % (ref 32–34.5)
MCV RBC AUTO: 93.7 FL (ref 80–99.9)
PDW BLD-RTO: 12.8 FL (ref 11.5–15)
PLATELET # BLD: 172 E9/L (ref 130–450)
PMV BLD AUTO: 10.3 FL (ref 7–12)
RBC # BLD: 3.35 E12/L (ref 3.5–5.5)
WBC # BLD: 10 E9/L (ref 4.5–11.5)

## 2018-07-17 PROCEDURE — 6370000000 HC RX 637 (ALT 250 FOR IP): Performed by: FAMILY MEDICINE

## 2018-07-17 PROCEDURE — 97530 THERAPEUTIC ACTIVITIES: CPT | Performed by: PHYSICAL THERAPIST

## 2018-07-17 PROCEDURE — 99222 1ST HOSP IP/OBS MODERATE 55: CPT | Performed by: NEUROLOGICAL SURGERY

## 2018-07-17 PROCEDURE — G8981 BODY POS CURRENT STATUS: HCPCS | Performed by: PHYSICAL THERAPIST

## 2018-07-17 PROCEDURE — G8988 SELF CARE GOAL STATUS: HCPCS

## 2018-07-17 PROCEDURE — 97161 PT EVAL LOW COMPLEX 20 MIN: CPT | Performed by: PHYSICAL THERAPIST

## 2018-07-17 PROCEDURE — 2580000003 HC RX 258: Performed by: FAMILY MEDICINE

## 2018-07-17 PROCEDURE — G8987 SELF CARE CURRENT STATUS: HCPCS

## 2018-07-17 PROCEDURE — 36415 COLL VENOUS BLD VENIPUNCTURE: CPT

## 2018-07-17 PROCEDURE — 97530 THERAPEUTIC ACTIVITIES: CPT

## 2018-07-17 PROCEDURE — 85027 COMPLETE CBC AUTOMATED: CPT

## 2018-07-17 PROCEDURE — G8982 BODY POS GOAL STATUS: HCPCS | Performed by: PHYSICAL THERAPIST

## 2018-07-17 PROCEDURE — 97166 OT EVAL MOD COMPLEX 45 MIN: CPT

## 2018-07-17 PROCEDURE — 1200000000 HC SEMI PRIVATE

## 2018-07-17 PROCEDURE — 99232 SBSQ HOSP IP/OBS MODERATE 35: CPT | Performed by: NURSE PRACTITIONER

## 2018-07-17 RX ADMIN — CEPHALEXIN 250 MG: 250 CAPSULE ORAL at 23:49

## 2018-07-17 RX ADMIN — Medication 10 ML: at 10:15

## 2018-07-17 RX ADMIN — Medication 2000 UNITS: at 10:15

## 2018-07-17 RX ADMIN — CEPHALEXIN 250 MG: 250 CAPSULE ORAL at 18:01

## 2018-07-17 RX ADMIN — Medication 10 ML: at 20:01

## 2018-07-17 RX ADMIN — CEPHALEXIN 250 MG: 250 CAPSULE ORAL at 14:27

## 2018-07-17 RX ADMIN — CEPHALEXIN 250 MG: 250 CAPSULE ORAL at 06:34

## 2018-07-17 ASSESSMENT — PAIN SCALES - GENERAL
PAINLEVEL_OUTOF10: 0
PAINLEVEL_OUTOF10: 0

## 2018-07-17 NOTE — PROGRESS NOTES
S: Patient presented for radiographs and possible extractions  O: 79 yo,  female  ASA: III       Allergies: Multiple listed in Epic       PS: 0/10  A: Multiple root tips  P: Patient presented to the dental clinic. Unable to get panoramic radiographs because patient had difficulty moving from the wheelchair. Took new PA's of remaining root tips. No apparent clinical or radiographic signs of infection. Spoke w/ nurses station and patient is taking p.o. Keflex. Spoke to patient about taking teeth out, and patient did not want teeth extracted today. Told he we can talk to son later, and son will want teeth taken out. Patient stated she did not want extractions today. Will follow up w/ doctor later this afternoon.     Dr. Sayda Palomares

## 2018-07-17 NOTE — PROGRESS NOTES
Rory Boland is a 80 y.o. woman    Neurology is following for possible Parkinson's disease    NSGY evaluated the patient for her spinal canal stenosis---no surgical intervention planned    She has no complaints for me at this time and still states she wants to go home. Resting tremors noted of both hands again today. She went to the dental clinic but did not want any teeth extractions today. No family present    Medically, TMax 100 today but otherwise stable    Objective:     /68   Pulse 76   Temp 99.2 °F (37.3 °C)   Resp 15   Ht 5' (1.524 m)   Wt 112 lb (50.8 kg)   SpO2 95%   BMI 21.87 kg/m²     General appearance: alert, appears stated age and cooperative---in no acute distress lying in bed  Head: Normocephalic, without obvious abnormality, atraumatic  Eyes: conjunctivae/corneas clear.  --arcus senilis  Neck: markedly limited ROM in all directions---no cogwheeling  Lungs: clear to auscultation bilaterally  Heart: regular rate and rhythm, S1, S2 normal, no murmur, click, rub or gallop  Extremities: trace ankle edema b/l  Pulses: 2+ and symmetric  Skin: Skin color, texture, turgor normal. No rashes or lesions     Mental Status: alert, oriented x4, thought content appropriate---minimal masked facies---pleasant and cooperative today    Appropriate attention/concentration  Intact fundus of knowledge  Questionable memories    Speech: no dysarthria---hypophonic speech  Language: no aphasias    Cranial Nerves:  I: smell NA   II: visual acuity  NA   II: visual fields Full to confrontation   II: pupils JESSY   III,VII: ptosis None   III,IV,VI: extraocular muscles  Full ROM   V: mastication Normal   V: facial light touch sensation  Normal   V,VII: corneal reflex     VII: facial muscle function - upper  Normal   VII: facial muscle function - lower Normal   VIII: hearing Normal   IX: soft palate elevation  Normal   IX,X: gag reflex    XI: trapezius strength  5/5   XI: sternocleidomastoid strength 5/5   XI:

## 2018-07-17 NOTE — PROGRESS NOTES
OT SESSION ATTEMPT     Date:2018  Patient Name: Chayo Feldman  MRN: [id number]  : 10/4/1924  Room: [address] [place] [address]     Attempted OT session this date:    [] unavailable due to other medical staff currently with pt   [] on hold per nursing staff   [] on hold per nursing staff secondary to lab / radiology results    [] declined Occupational Therapy  this date due to ___. Benefits of participation in therapy reviewed with pt.    [] off unit   [x] Other:  Await NS rec d/t abnormal MRI of spine    Will reattempt OT eval at a later time.     Michele Nix, [place] [id number]

## 2018-07-17 NOTE — PROGRESS NOTES
OCCUPATIONAL THERAPY INITIAL EVALUATION      Date:2018  Patient Name: Charito Vásquez  MRN: [id number]  : 10/4/1924  Room: [address] [place] [address]     Evaluating OT: Arya Yancey, OTR/L     Recommended Adaptive Equipment:  TBD  AM-PAC Daily Activity Raw Score =    G-code = CL    Diagnosis:   1. Generalized weakness    2. Fatigue, unspecified type       Patient Active Problem List   Diagnosis    Osteoarthritis of left knee    Osteoarthritis of right knee    S/P prosthetic total arthroplasty of the hip    Closed compression fracture of L1 lumbar vertebra, with routine healing, subsequent encounter    Traumatic r[address]domyolysis [address]    Cardiac enzymes elevated    History of rheumatic fever    Lumbosacral strain    Other fatigue    Generalized weakness      Past Medical History:   Past Medical History:   Diagnosis Date    Allergy history, drug     patient very sensitive to all medications    Arthritis     rheumatoid at both knees since childhood    Autoimmune disease ([address]     sojourns disease, c/o dry mouth and eyes    Autoimmune disorder ([facility]     re[place]aul[place],at hands/feet    Chronic back pain     Dementia     [place] (hard of hearing)     uses aide bilateral    Nausea & vomiting     extreme nausea    Raynaud's disease     Rheumatic fever     as a child, , no definite diagnisis of cardiac complications    Wears dentures     wears a tempory  partial top       Precautions:  Falls,  Bed alarm     Home Living: Poor historian. Pt stated she was currently getting therapy at [facility]. Prior Level of Function:  Poor historian- Pt stated that she was indep    Pain Level: pt c/o 0/10 pain  this session. Cognition: oriented x to self (stated month was august, sept, Oct and  year ); follows 2 step directions.     Problem solving skills: poor   Memory:  poor   Sequencing: poor   Safety awareness: poor    Hearing: [place]  Vision/Perception: wears glasses    UE Assessment:  Hand Dominance: R Strength ROM Additional Info:    RUE   3+/5 WFL  WFL  and  FMC/dexterity noted during ADL tasks     LUE NT partial Contraction L hand   Sensation: NT No c/o numbness or tingling   Tone: impaired/parkinsons  Edema:  no     Functional Assessment:   Initial Status: Date: 7/17/18 Treatment: Date:    Feeding  Minimal Assist      Grooming  Moderate Assist        Upper Body Dressing Moderate Assist       Lower Body Dressing Maximal Assist       Bathing UE: Maximal Assist    LE:  Maximal Assist    Toileting  Dependent      Bed Mobility  Supine to Sit: Moderate Assist    Scooting: Moderate Assist  Sit to Supine: Moderate Assist    Functional Transfers Sit to stand: Moderate Assist +2 assist  Stand pivot:  NT        Functional Mobility Moderate Assist with FWW to side step x 4 steps    Sit balance: wfl  Stand balance:  Retro lean/ mod A  Endurance/Activity tolerance:  poor                              Comments/Treatment:  OT evaluation completed. Upon arrival pt in bed. .  Pt educated on techniques to increase safety and independence with bed mobility, functional transfers and functional mobility. Sitting EOB x 5 minutes to increase sitting balance and activity tolerance OOB. Functional transfers sit to stand with verbal and tactile cues for balance and safety. Cues for sequencing with walker to take side steps. At end of session pt in bed with all devices within reach, all lines and tubes intact. Call light and phone within reach.   -- Pt would benefit from continued skilled OT     Assessment of Current Deficits   Functional mobility [x]  ROM [] Strength [x]  Cognition [x]  ADLs [x]   IADLs [] Safety Awareness [x] Endurance [x]  Fine Motor Coordination [] Balance [x] Vision/perception [] Sensation []   Gross Motor Coordination []     Eval Complexity: Med  Profile and History- med  Assessment of Occupational Performance and Identification of Deficits- med  Clinical Decision Making- med    Treatment Frequency:  PRN  1-3

## 2018-07-17 NOTE — CONSULTS
drinks alcohol.     Family History:  Family History   History reviewed. No pertinent family history.        Review of Systems:  Denies fever, chills, or night sweats  Denies headache, dizziness, syncope  Denies blurred vision, double vision  Denies chest pain, palpitations, SOB  Denies diarrhea, constipation, n/v  Denies dysuria, hematuria  Denies recent infections  Denies easy bruising  Denies anxiety, depression     Physical Exam:  WDWN, resting comfortable, no apparent distress  Appears stated age  Vitals stable  Non-labored breathing   A&O x 2, not to time   Head is normocephalic, atraumatic   No palpable lymphadenopathy   Abdomen soft, nontender  Pupils equal and reactive, no scleral icterus  EOMI bilaterally  Cranial nerves II-XII intact bilaterally  No drift  4/5 in BUE  4/5 in BLE  Resting tremors and bradykinesias noted   Sensation to LT intact x 4 ext  Toes going down  Skin warm and dry     Review of Imaging:  MRI Cervical Spine   Impression   ALERT:  THIS IS AN ABNORMAL REPORT   1. Multilevel degenerative changes C2-C7. See above for details. 2. Abnormal cervical spinal cord signal extending from approximately   the C3-C4 intervertebral disc space level through inferior C5   vertebral body possibly reflective of an underlying degree of   myelomalacia secondary to the spinal canal stenosis. 3. Heterogeneous bone marrow signal intensity a nonspecific finding. This may simply represent red marrow conversion, however, the   differential diagnosis also includes more concerning processes such   as, but not limited to, hemosiderosis, mastocytosis, multiple myeloma,   myelofibrosis, sarcoidosis, hematopoietic hyperplasia, leukemia,   lymphoma or metastatic disease. 4. Vertebral body hemangioma at T5.         Assessment: This is a 80year old with cervical stenosis and parkinsonian fatures.  Stable.      Plan:  -No surgical intervention, patient is a poor candidate  -Neurology following and input

## 2018-07-17 NOTE — PROGRESS NOTES
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 Social service to aid son, Tootie Beckman, with placement issues. Continue Keflex and continue to follow wbc count and temperature. I will discuss with Beri if just comfort measures are desired due to [facility] refusal of treatments. Appreciate input from dental service.

## 2018-07-17 NOTE — PROGRESS NOTES
[facility]  Physical Therapy Initial Evaluation    Name: Chayo Feldman  : 10/4/1924  MRN: [id number]    Date of Service: 2018    Evaluating Therapist: Zac Campuzano PT, DPT       Equipment Recommendations: to be determined. Room #: 3091/7609-K  DIAGNOSIS: fatigue  PRECAUTIONS: fall risk, bed alarm    Social:  Pt admit from [facility] per pt. Prior to admission pt walked with w/w per pt. Initial Evaluation  Date:  Treatment      Short Term/ Long Term   Goals   Was pt agreeable to Eval/treatment? yes     Does pt have pain? No c/o pa in     Bed Mobility  Rolling: NT  Supine to sit: mod A   Sit to supine: mod A   Scooting: Nt  Min A    Transfers Sit to stand: mod A x 2  Stand to sit: mod A x 2  Stand pivot: NT  Min A    Ambulation    4-5 sidesteps with w/w mod A x 2  25+ feet with w/w with min A    Stair negotiation: ascended and descended  NT  NT   AM-PAC Raw Score 8/24       Pt is alert and Oriented x2  ROM:  L LE grossly WFL  R LE grossly WFL  Strength:   L LE grossly at least 3/5  R LE grossly at least 3/5  Balance: standing mod A x 2- pt leans back  Sensation: no c/o numbness/tingling. Edema: none noted. Endurance: fair -   Bed alarm:  Reapplied end of evaluation     ASSESSMENT  Pt displays functional ability as noted in the objective portion of this evaluation. Comments/Treatment:  Pt found laying in bed agreeable to evaluation. Pt instructed in mobility. Pt demonstrates decreased insight into deficits. Pt ambulates with short shuffling steps and is retropulsive. Pt left laying in bed with call button in reach and bed alarm reapplied end of evaluation. Patient education  Pt educated on mobility. Patient response to education:   Pt verbalized understanding Pt demonstrated skill Pt requires further education in this area     x     Rehab potential is Good for reaching above PT goals. Pts/ family goals   1. Go home.      Patient and or family understand(s) diagnosis,

## 2018-07-17 NOTE — PROGRESS NOTES
Neurology attempted to see patient for follow up but she was off the floor at the dental clinic.     Will attempt later as able    Petar Camejo  11:28 AM

## 2018-07-18 ENCOUNTER — APPOINTMENT (OUTPATIENT)
Dept: CT IMAGING | Age: 83
DRG: 884 | End: 2018-07-18
Payer: MEDICARE

## 2018-07-18 PROBLEM — R53.1 GENERALIZED WEAKNESS: Chronic | Status: ACTIVE | Noted: 2018-07-13

## 2018-07-18 PROBLEM — T79.6XXA TRAUMATIC RHABDOMYOLYSIS (HCC): Status: RESOLVED | Noted: 2018-04-23 | Resolved: 2018-07-18

## 2018-07-18 LAB
ANION GAP SERPL CALCULATED.3IONS-SCNC: 12 MMOL/L (ref 7–16)
BUN BLDV-MCNC: 27 MG/DL (ref 8–23)
CALCIUM SERPL-MCNC: 8.8 MG/DL (ref 8.6–10.2)
CHLORIDE BLD-SCNC: 103 MMOL/L (ref 98–107)
CO2: 24 MMOL/L (ref 22–29)
CREAT SERPL-MCNC: 0.6 MG/DL (ref 0.5–1)
GFR AFRICAN AMERICAN: >60
GFR NON-AFRICAN AMERICAN: >60 ML/MIN/1.73
GLUCOSE BLD-MCNC: 92 MG/DL (ref 74–109)
HCT VFR BLD CALC: 32.7 % (ref 34–48)
HEMOGLOBIN: 10.7 G/DL (ref 11.5–15.5)
MCH RBC QN AUTO: 31 PG (ref 26–35)
MCHC RBC AUTO-ENTMCNC: 32.7 % (ref 32–34.5)
MCV RBC AUTO: 94.8 FL (ref 80–99.9)
PDW BLD-RTO: 12.7 FL (ref 11.5–15)
PLATELET # BLD: 186 E9/L (ref 130–450)
PMV BLD AUTO: 10 FL (ref 7–12)
POTASSIUM SERPL-SCNC: 3.7 MMOL/L (ref 3.5–5)
RBC # BLD: 3.45 E12/L (ref 3.5–5.5)
SODIUM BLD-SCNC: 139 MMOL/L (ref 132–146)
WBC # BLD: 8 E9/L (ref 4.5–11.5)

## 2018-07-18 PROCEDURE — 36415 COLL VENOUS BLD VENIPUNCTURE: CPT

## 2018-07-18 PROCEDURE — 1200000000 HC SEMI PRIVATE

## 2018-07-18 PROCEDURE — 6360000002 HC RX W HCPCS: Performed by: FAMILY MEDICINE

## 2018-07-18 PROCEDURE — 74176 CT ABD & PELVIS W/O CONTRAST: CPT

## 2018-07-18 PROCEDURE — 80048 BASIC METABOLIC PNL TOTAL CA: CPT

## 2018-07-18 PROCEDURE — 6370000000 HC RX 637 (ALT 250 FOR IP): Performed by: FAMILY MEDICINE

## 2018-07-18 PROCEDURE — 85027 COMPLETE CBC AUTOMATED: CPT

## 2018-07-18 PROCEDURE — 2580000003 HC RX 258: Performed by: FAMILY MEDICINE

## 2018-07-18 RX ORDER — MECLIZINE HYDROCHLORIDE 25 MG/1
12.5 TABLET ORAL 3 TIMES DAILY PRN
Qty: 20 TABLET | Refills: 1 | Status: SHIPPED | OUTPATIENT
Start: 2018-07-18

## 2018-07-18 RX ORDER — CEPHALEXIN 250 MG/1
250 CAPSULE ORAL 4 TIMES DAILY
Qty: 28 CAPSULE | Refills: 0 | Status: SHIPPED | OUTPATIENT
Start: 2018-07-18 | End: 2018-07-19 | Stop reason: HOSPADM

## 2018-07-18 RX ADMIN — SERTRALINE 12.5 MG: 50 TABLET, FILM COATED ORAL at 09:24

## 2018-07-18 RX ADMIN — CEPHALEXIN 250 MG: 250 CAPSULE ORAL at 23:26

## 2018-07-18 RX ADMIN — ACETAMINOPHEN 650 MG: 325 TABLET, FILM COATED ORAL at 08:05

## 2018-07-18 RX ADMIN — MAGNESIUM HYDROXIDE 30 ML: 400 SUSPENSION ORAL at 12:23

## 2018-07-18 RX ADMIN — CEPHALEXIN 250 MG: 250 CAPSULE ORAL at 18:27

## 2018-07-18 RX ADMIN — Medication 2000 UNITS: at 09:24

## 2018-07-18 RX ADMIN — CEPHALEXIN 250 MG: 250 CAPSULE ORAL at 06:10

## 2018-07-18 RX ADMIN — CEPHALEXIN 250 MG: 250 CAPSULE ORAL at 11:51

## 2018-07-18 RX ADMIN — Medication 10 ML: at 08:06

## 2018-07-18 RX ADMIN — ENOXAPARIN SODIUM 40 MG: 40 INJECTION SUBCUTANEOUS at 08:06

## 2018-07-18 RX ADMIN — Medication 10 ML: at 20:49

## 2018-07-18 ASSESSMENT — PAIN DESCRIPTION - PROGRESSION: CLINICAL_PROGRESSION: NOT CHANGED

## 2018-07-18 ASSESSMENT — PAIN SCALES - GENERAL
PAINLEVEL_OUTOF10: 0
PAINLEVEL_OUTOF10: 6
PAINLEVEL_OUTOF10: 3
PAINLEVEL_OUTOF10: 0

## 2018-07-18 ASSESSMENT — PAIN DESCRIPTION - FREQUENCY: FREQUENCY: INTERMITTENT

## 2018-07-18 ASSESSMENT — PAIN DESCRIPTION - PAIN TYPE: TYPE: CHRONIC PAIN

## 2018-07-18 ASSESSMENT — PAIN DESCRIPTION - LOCATION: LOCATION: BACK

## 2018-07-18 ASSESSMENT — PAIN DESCRIPTION - DESCRIPTORS: DESCRIPTORS: ACHING;DISCOMFORT;SORE

## 2018-07-18 ASSESSMENT — PAIN DESCRIPTION - ORIENTATION: ORIENTATION: LOWER

## 2018-07-18 ASSESSMENT — PAIN DESCRIPTION - ONSET: ONSET: ON-GOING

## 2018-07-18 NOTE — CARE COORDINATION
Social Work Discharge Planning:  Patient accepted at [facility], precert started. N-17 generated, [facility] ambulance form is soft chart. SW following.   Electronically signed by ROMINA Oneal on 7/18/2018 at 1:08 PM

## 2018-07-18 NOTE — CONSULTS
BASOSABS 0.02 07/13/2018       CMP     Lab Results   Component Value Date     07/18/2018    K 3.7 07/18/2018    K 3.7 07/14/2018     07/18/2018    CO2 24 07/18/2018    BUN 27 07/18/2018    CREATININE 0.6 07/18/2018    GFRAA >60 07/18/2018    LABGLOM >60 07/18/2018    GLUCOSE 92 07/18/2018    PROT 6.8 07/16/2018    LABALBU 3.2 07/16/2018    CALCIUM 8.8 07/18/2018    BILITOT 0.7 07/16/2018    ALKPHOS 55 07/16/2018    AST 14 07/16/2018    ALT 8 07/16/2018         Hepatic Function Panel:    Lab Results   Component Value Date    ALKPHOS 55 07/16/2018    ALT 8 07/16/2018    AST 14 07/16/2018    PROT 6.8 07/16/2018    BILITOT 0.7 07/16/2018    LABALBU 3.2 07/16/2018       PT/INR:    Lab Results   Component Value Date    PROTIME 12.6 07/13/2018    INR 1.1 07/13/2018       TSH:    Lab Results   Component Value Date    TSH 0.848 07/14/2018       U/A:    Lab Results   Component Value Date    COLORU Yellow 07/15/2018    PHUR 6.0 07/15/2018    WBCUA NONE 07/15/2018    RBCUA 1-3 07/15/2018    BACTERIA NONE 07/15/2018    CLARITYU Clear 07/15/2018    SPECGRAV >=1.030 07/15/2018    LEUKOCYTESUR Negative 07/15/2018    UROBILINOGEN 0.2 07/15/2018    BILIRUBINUR Negative 07/15/2018    BLOODU Negative 07/15/2018    GLUCOSEU Negative 07/15/2018       ABG:  No results found for: CTR1EYC, BEART, K0EKWXVV, PHART, THGBART, YBU0AOY, PO2ART, YFU2EXU    TSH -  Resulting lab: [facility]   Reference range: 0.270 - 4.200 uIU/mL   Value: 0.848       MICROBIOLOGY:    Blood culture - negative     Radiology :    MRI C spine -    ALERT:  THIS IS AN ABNORMAL REPORT  1. Multilevel degenerative changes C2-C7. See above for details. 2. Abnormal cervical spinal cord signal extending from approximately  the C3-C4 intervertebral disc space level through inferior C5  vertebral body possibly reflective of an underlying degree of  myelomalacia secondary to the spinal canal stenosis.   3. Heterogeneous bone marrow

## 2018-07-18 NOTE — CARE COORDINATION
Social Work Discharge Planning:  Patient is accepted at [facility], can go without precert. Patient is tentatively set-up to leave at 5:30 PM via [employer]. If patient leaves the bedside nurse will need to notify the family. SW following.   Electronically signed by ROMINA Israel on 7/18/2018 at 1:54 PM

## 2018-07-18 NOTE — CARE COORDINATION
Social Work Discharge Planning:  BRUCE followed up with River Carrel from [address] [place] [address], no beds available, BRUCE spoke with patient's son Jocelyn Owens, prefers [facility]. BRUCE made referral to Rigoberto Thomas with [facility], no beds available, BRUCE made a referral to Milton Parikh with [address], [place] will await answer. N-17 generated.  BRUCE following  Electronically signed by ROMINA Sun on 7/18/2018 at 10:52 AM

## 2018-07-19 LAB
BLOOD CULTURE, ROUTINE: NORMAL
CULTURE, BLOOD 2: NORMAL

## 2018-07-19 PROCEDURE — 1200000000 HC SEMI PRIVATE

## 2018-07-19 PROCEDURE — 2580000003 HC RX 258: Performed by: FAMILY MEDICINE

## 2018-07-19 PROCEDURE — 6370000000 HC RX 637 (ALT 250 FOR IP): Performed by: FAMILY MEDICINE

## 2018-07-19 PROCEDURE — 6360000002 HC RX W HCPCS: Performed by: FAMILY MEDICINE

## 2018-07-19 RX ADMIN — Medication 10 ML: at 08:24

## 2018-07-19 RX ADMIN — ENOXAPARIN SODIUM 40 MG: 40 INJECTION SUBCUTANEOUS at 08:23

## 2018-07-19 RX ADMIN — CEPHALEXIN 250 MG: 250 CAPSULE ORAL at 11:28

## 2018-07-19 RX ADMIN — CEPHALEXIN 250 MG: 250 CAPSULE ORAL at 06:13

## 2018-07-19 RX ADMIN — CEPHALEXIN 250 MG: 250 CAPSULE ORAL at 23:55

## 2018-07-19 RX ADMIN — Medication 2000 UNITS: at 08:23

## 2018-07-19 RX ADMIN — CEPHALEXIN 250 MG: 250 CAPSULE ORAL at 17:44

## 2018-07-19 NOTE — PROGRESS NOTES
C/C: Fever       The patient is awake and alert. Denies fever, chills  Denies pain   Afebrile today     Scheduled Meds:   cephALEXin  250 mg Oral 4 times per day    sodium chloride flush  10 mL Intravenous 2 times per day    enoxaparin  40 mg Subcutaneous Daily    vitamin D  2,000 Units Oral Daily    estradiol  2 g Vaginal Once per day on Mon    sertraline  12.5 mg Oral Every Other Day     Continuous Infusions:  PRN Meds:.polyvinyl alcohol, sodium chloride flush, acetaminophen, ALPRAZolam, lidocaine, meclizine, magnesium hydroxide, ondansetron       Review of Systems:     CONSTITUTIONAL: no  fever. HEENT: denies blurring of vision or double vision, denies hearing problem  RESPIRATORY: denies cough, shortness of breath, sputum expectoration, chest pain. CARDIOVASCULAR:  Denies palpitation  GASTROINTESTINAL:  Denies abdomen pain, diarrhea or constipation. GENITOURINARY:  Denies burning urination or frequency of urination  INTEGUMENT: denies wound , rash  HEMATOLOGIC/LYMPHATIC:  Denies lymph node swelling, gum bleeding or easy bruising. MUSCULOSKELETAL:  Back pain   NEUROLOGICAL:  Weakness        Objective:    Vitals:    07/19/18 0715   BP: (!) 150/80   Pulse: 72   Resp: 16   Temp: 97.9 °F (36.6 °C)   SpO2: 96%     General Appearance:    Awake, alert , no acute distress. Head:    Normocephalic, atraumatic   Eyes:    + pallor, no icterus,   Ears:    No obvious deformity or drainage.    Nose:   No nasal drainage   Throat:   Mucosa moist, no oral thrush   Neck:   Supple, no lymphadenopathy   Back:     no CVA tenderness   Lungs:     Clear to auscultation bilaterally, no wheeze    Heart:    Regular rate and rhythm, no murmur, rub or gallop   Abdomen:     Soft, non-tender, bowel sounds present    Extremities:   No edema, no cyanosis ,no open wound,no erythema, no     tenderness   Pulses:   Dorsalis pedis palpable    Skin:   no rashes or lesions     CBC with Differential:      Lab Results   Component Value Date

## 2018-07-19 NOTE — CARE COORDINATION
Social Work Discharge Planning:  SW spoke with the Ferddie from [address] [place] [address], can accept patient Saturday afternoon if ready for discharge, will need a PT note tomorrow. N-17 generated, [facility]ENS complete and ambulance form in soft chart.   Electronically signed by ROMINA Costa on 7/19/2018 at 11:09 AM

## 2018-07-20 VITALS
HEART RATE: 76 BPM | OXYGEN SATURATION: 95 % | BODY MASS INDEX: 21.99 KG/M2 | RESPIRATION RATE: 18 BRPM | WEIGHT: 112 LBS | TEMPERATURE: 98.7 F | SYSTOLIC BLOOD PRESSURE: 122 MMHG | DIASTOLIC BLOOD PRESSURE: 70 MMHG | HEIGHT: 60 IN

## 2018-07-20 PROCEDURE — 6360000002 HC RX W HCPCS: Performed by: FAMILY MEDICINE

## 2018-07-20 PROCEDURE — 97535 SELF CARE MNGMENT TRAINING: CPT

## 2018-07-20 PROCEDURE — 6370000000 HC RX 637 (ALT 250 FOR IP): Performed by: FAMILY MEDICINE

## 2018-07-20 PROCEDURE — 97530 THERAPEUTIC ACTIVITIES: CPT

## 2018-07-20 RX ADMIN — ENOXAPARIN SODIUM 40 MG: 40 INJECTION SUBCUTANEOUS at 08:37

## 2018-07-20 RX ADMIN — SERTRALINE 12.5 MG: 50 TABLET, FILM COATED ORAL at 08:37

## 2018-07-20 RX ADMIN — CEPHALEXIN 250 MG: 250 CAPSULE ORAL at 06:11

## 2018-07-20 RX ADMIN — CEPHALEXIN 250 MG: 250 CAPSULE ORAL at 11:34

## 2018-07-20 RX ADMIN — Medication 2000 UNITS: at 08:37

## 2018-07-20 ASSESSMENT — PAIN SCALES - GENERAL: PAINLEVEL_OUTOF10: 0

## 2018-07-20 NOTE — PROGRESS NOTES
Physical Therapy  Facility/Department: [facility]  Daily Treatment Note  NAME: René [facility]  : 10/4/1924  MRN: [id number]    Date of Service: 2018  Evaluating Therapist: Rocio Burkett PT, DPT         Equipment Recommendations: to be determined.      Room #: 7778/1854-J  DIAGNOSIS: fatigue  PRECAUTIONS: fall risk, bed alarm, TSM     Social:  Pt admit from [facility] per pt. Prior to admission pt walked with w/w per pt.       Initial Evaluation  Date:  Treatment     18 Short Term/ Long Term   Goals   Was pt agreeable to Eval/treatment? yes  yes     Does pt have pain? No c/o pa in  no c/o pain      Bed Mobility  Rolling: NT  Supine to sit: mod A   Sit to supine: mod A   Scooting: Nt  NT Min A    Transfers Sit to stand: mod A x 2  Stand to sit: mod A x 2  Stand pivot: NT Sit to stand: mod A x 2  Stand to sit: mod A x 2  Stand pivot: max +2 EOB <> B/S chair  Min A    Ambulation    4-5 sidesteps with w/w mod A x 2 4 pivoting steps EOB <> B/S chair max +2 25+ feet with w/w with min A    Stair negotiation: ascended and descended  NT  NT NT   AM-PAC Raw Score           Balance: fair -       Additional Comments:  Pt was seated n B/S chair on arrival.  [facility] had just A pt w/ bed pan in B/S chair. Pt initially wanted to return to bed, had been up for a bit per [facility]. A pt to sit EOB, and pt then stated , \" Can I sit in the chair? \" A pt back to B/S chair as noted. She has poor insight into safety deficits. Risk for falls. Limited use of L hand d/t arthritic changes. Pt was left B/S chair  with call light left by patient. Chair/bed alarm: TSM    Time in:1013  Time out: 0      Continue with physical therapy current plan of care.     Tenisha Breaux PTA 9858

## 2018-07-20 NOTE — PROGRESS NOTES
mobility to supervision for EOB ADLs  Goal 4) Pt will increase functional transfers to SBA for improved indep with ADL  Goal 5) Pt will increase functional mobility to SBA for improved indep with ADL  Goal 6) Demo good safety, sequencing and orientation x3    Functional Assessment:    Initial Status: Date: 7/17/18 Treatment: Date: 7/20/18   Feeding  Minimal Assist    S/Setup  Pt required assist for setup of meal tray this date d/t decreased functional use of L hand. Grooming  Moderate Assist      Min A  Combing hair seated in chair. Pt required assist to comb back of hair. Min cues for sequencing required. Upper Body Dressing Moderate Assist     Min A  To don hospital gown seated in chair. Pt requires assist to manage clothing over R shoulder and around trunk. Lower Body Dressing Maximal Assist     Dependent  To don/doff socks seated in chair. Pt educated on adaptive techniques this PM however pt deferred stating \"I don't really want to\". Will continue to address. Bathing UE: Maximal Assist    LE:  Maximal Assist  Per Eval  Pt just finished bathing with [facility] prior to therapist arrival. Will continue to assess. Toileting  Dependent    Per Eval  Will continue to assess   Bed Mobility  Supine to Sit: Moderate Assist    Scooting: Moderate Assist  Sit to Supine: Moderate Assist  Per Eval  Pt sitting up prior to/after tx. Functional Transfers Sit to stand: Moderate Assist +2 assist  Stand pivot:  NT      Sit<>Stand: Max A  Stand Pivot Transfer: Max A  Completed x2 reps with mod cues for hand placement/safety with poor carryover. SPT completed from bed>chair with min cues for sequencing/safety. Pt demo'd forward flexed posture during transfers and retro lean. Functional Mobility Moderate Assist with FWW to side step x 4 steps  Per Eval  Will continue to assess. Sit balance: SBA seated at EOB x5 minutes. Stand balance: Max/Mod A without device.  Pt demo'd retro lean, unable to self correct with

## 2018-07-24 ENCOUNTER — HOSPITAL ENCOUNTER (OUTPATIENT)
Age: 83
Discharge: HOME | End: 2018-07-26
Payer: MEDICARE

## 2018-07-24 LAB
ALBUMIN SERPL-MCNC: 2.8 G/DL (ref 3.5–5.2)
ALP BLD-CCNC: 41 U/L (ref 35–104)
ALT SERPL-CCNC: <5 U/L (ref 0–32)
ANION GAP SERPL CALCULATED.3IONS-SCNC: 10 MMOL/L (ref 7–16)
AST SERPL-CCNC: 13 U/L (ref 0–31)
BILIRUB SERPL-MCNC: <0.2 MG/DL (ref 0–1.2)
BUN BLDV-MCNC: 32 MG/DL (ref 8–23)
CALCIUM SERPL-MCNC: 8.7 MG/DL (ref 8.6–10.2)
CHLORIDE BLD-SCNC: 106 MMOL/L (ref 98–107)
CO2: 27 MMOL/L (ref 22–29)
CREAT SERPL-MCNC: 0.5 MG/DL (ref 0.5–1)
GFR AFRICAN AMERICAN: >60
GFR NON-AFRICAN AMERICAN: >60 ML/MIN/1.73
GLUCOSE BLD-MCNC: 85 MG/DL (ref 74–109)
HCT VFR BLD CALC: 31.7 % (ref 34–48)
HEMOGLOBIN: 10.1 G/DL (ref 11.5–15.5)
MCH RBC QN AUTO: 30.6 PG (ref 26–35)
MCHC RBC AUTO-ENTMCNC: 31.9 % (ref 32–34.5)
MCV RBC AUTO: 96.1 FL (ref 80–99.9)
PDW BLD-RTO: 12.4 FL (ref 11.5–15)
PLATELET # BLD: 274 E9/L (ref 130–450)
PMV BLD AUTO: 9.8 FL (ref 7–12)
POTASSIUM SERPL-SCNC: 3.6 MMOL/L (ref 3.5–5)
RBC # BLD: 3.3 E12/L (ref 3.5–5.5)
SODIUM BLD-SCNC: 143 MMOL/L (ref 132–146)
TOTAL PROTEIN: 5.8 G/DL (ref 6.4–8.3)
WBC # BLD: 7 E9/L (ref 4.5–11.5)

## 2018-07-24 PROCEDURE — 80053 COMPREHEN METABOLIC PANEL: CPT

## 2018-07-24 PROCEDURE — 36415 COLL VENOUS BLD VENIPUNCTURE: CPT

## 2018-07-24 PROCEDURE — 85027 COMPLETE CBC AUTOMATED: CPT

## 2018-07-26 ENCOUNTER — HOSPITAL ENCOUNTER (OUTPATIENT)
Age: 83
Discharge: HOME | End: 2018-07-28
Payer: MEDICARE

## 2018-07-26 LAB
FOLATE: >20 NG/ML (ref 4.8–24.2)
VITAMIN B-12: 720 PG/ML (ref 211–946)

## 2018-07-26 PROCEDURE — 82607 VITAMIN B-12: CPT

## 2018-07-26 PROCEDURE — 82746 ASSAY OF FOLIC ACID SERUM: CPT

## 2018-07-26 PROCEDURE — 36415 COLL VENOUS BLD VENIPUNCTURE: CPT

## 2018-08-06 ENCOUNTER — OFFICE VISIT (OUTPATIENT)
Dept: NEUROLOGY | Age: 83
End: 2018-08-06
Payer: MEDICARE

## 2018-08-06 VITALS
OXYGEN SATURATION: 99 % | HEIGHT: 60 IN | BODY MASS INDEX: 22.78 KG/M2 | RESPIRATION RATE: 18 BRPM | WEIGHT: 116 LBS | SYSTOLIC BLOOD PRESSURE: 146 MMHG | DIASTOLIC BLOOD PRESSURE: 75 MMHG | TEMPERATURE: 98.1 F | HEART RATE: 82 BPM

## 2018-08-06 DIAGNOSIS — G20 PARKINSON'S DISEASE (HCC): Primary | ICD-10-CM

## 2018-08-06 PROCEDURE — 99214 OFFICE O/P EST MOD 30 MIN: CPT | Performed by: NURSE PRACTITIONER

## 2018-08-06 RX ORDER — SODIUM PHOSPHATE, DIBASIC AND SODIUM PHOSPHATE, MONOBASIC 7; 19 G/133ML; G/133ML
1 ENEMA RECTAL
COMMUNITY
End: 2019-02-19

## 2018-08-06 RX ORDER — BISACODYL 10 MG
10 SUPPOSITORY, RECTAL RECTAL DAILY PRN
COMMUNITY
End: 2019-02-19

## 2018-08-06 NOTE — PROGRESS NOTES
CAPS capsule Take 1 capsule by mouth daily      acetaminophen (TYLENOL) 325 MG tablet Take 1 tablet by mouth every 4 hours as needed for Pain 120 tablet 3    sertraline (ZOLOFT) 25 MG tablet Take 12.5 mg by mouth every other day  5    ALPRAZolam (XANAX) 0.25 MG tablet Take 0.25 mg by mouth 3 times daily as needed.  lidocaine (LIDODERM) 5 % Place 1 patch onto the skin as needed. 12 hours on, 12 hours off as needs for lower back pain        No current facility-administered medications for this visit. Objective:     BP (!) 146/75 (Site: Right Arm, Position: Sitting, Cuff Size: Medium Adult)   Pulse 82   Temp 98.1 °F (36.7 °C)   Resp 18   Ht 5' (1.524 m)   Wt 116 lb (52.6 kg)   SpO2 99%   Breastfeeding? No   BMI 22.65 kg/m²     General appearance: alert, appears stated age and cooperative---in no acute distress in wheelchair---minimal masked facies  Head: Normocephalic, without obvious abnormality, atraumatic  Eyes: conjunctivae/corneas clear.  --arcus senilis  Neck: markedly limited ROM in all directions---no cogwheeling  Lungs: clear to auscultation bilaterally  Heart: regular rate and rhythm, S1, S2 normal, no murmur, click, rub or gallop  Extremities: 1+ankle edema b/l; no cyanosis; arthritic changes/contracture to L hand  Pulses: 2+ and symmetric  Skin: Skin color, texture, turgor normal. No rashes or lesions     Mental Status: alert, oriented x4, thought content appropriate------pleasant and cooperative today    Appropriate attention/concentration  Intact fundus of knowledge  Questionable memories  Follows all commands well    Speech: hypophonic speech  Language: no aphasias    Cranial Nerves:  I: smell NA   II: visual acuity  NA   II: visual fields Full to confrontation   II: pupils JESSY   III,VII: ptosis None   III,IV,VI: extraocular muscles  Full ROM   V: mastication Normal   V: facial light touch sensation  Normal   V,VII: corneal reflex     VII: facial muscle function - upper  Normal VII: facial muscle function - lower Normal   VIII: hearing Normal   IX: soft palate elevation  Normal   IX,X: gag reflex    XI: trapezius strength  5/5   XI: sternocleidomastoid strength 5/5   XI: neck extension strength  5/5   XII: tongue strength  Normal     Motor:  5/5 both arms---L hand contracted  5/5 both legs  Mod resting tremors of b/l arms R>L  Cogwheeling at R elbow today  Spastic legs  Moderate bradykinesias    Sensory:  LT intact b/l  Vibration impaired at ankles    Coordination:  FN intact b/l    Gait:  Moderate Nikolski's  Stooped and antalgic----frontal lobe gait apraxia (R leg sticks); mod shuffling  No festination    DTR:   Right Brachioradialis reflex 1+  Left Brachioradialis reflex 2+  Right Biceps reflex 1+  Left Biceps reflex 1+  Right Quadriceps reflex 0  Left Quadriceps reflex 0  Right Achilles reflex 0  Left Achilles reflex 0    No Donohue's or pathological reflexe    Laboratory/Radiology:     Lab Results   Component Value Date    WBC 7.0 07/24/2018    HGB 10.1 (L) 07/24/2018    HCT 31.7 (L) 07/24/2018    MCV 96.1 07/24/2018     07/24/2018     Lab Results   Component Value Date     07/24/2018    K 3.6 07/24/2018     07/24/2018    CO2 27 07/24/2018    BUN 32 (H) 07/24/2018    CREATININE 0.5 07/24/2018    GLUCOSE 85 07/24/2018    CALCIUM 8.7 07/24/2018    PROT 5.8 (L) 07/24/2018    LABALBU 2.8 (L) 07/24/2018    BILITOT <0.2 07/24/2018    ALKPHOS 41 07/24/2018    AST 13 07/24/2018    ALT <5 07/24/2018    LABGLOM >60 07/24/2018    GFRAA >60 07/24/2018     All labs and images reviewed today    Assessment: 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 She is tolerating low dose Sinemet 10/100 BID with some improvement in gait and tremors, however I find moderate resting tremors, gait apraxia, and cogwheeling on exam today-----and feel she would benefit from an increase to 25/100 BID.     I explained the reason behind this dose increase and reviewed possible side effects---family/ECF will keep me closely informed on her progress----she was reassured and agree to this plan. Her proved cervical spinal stenosis is contributing to her gait issues as well--not a surgical candidate. Fall precautions were reviewed.     Medically, her autoimmune diseases (Sjogren's, RA, Raynaud's) are stable    Plan:     Increase Sinemet 25/100 BID   Monitor closely for side effects    Fall precautions    RTO in 4 months or sooner PRN    RODGER Capps, FNP-C  10:20 AM  8/6/2018

## 2018-08-06 NOTE — PATIENT INSTRUCTIONS
Patient Education        Parkinson's Disease: Care Instructions  Your Care Instructions  Parkinson's disease can cause tremors, stiffness, and problems with movement. Severe or advanced cases can also cause problems with thinking. In Parkinson's disease, part of the brain cannot make enough dopamine, a chemical that helps control movement. Taking your medicines correctly and getting regular exercise may help you maintain your quality of life. There are many things that can cause Parkinson's disease symptoms, including some medicine, some toxins, and trauma to the head. The cause in most cases is not known. Follow-up care is a key part of your treatment and safety. Be sure to make and go to all appointments, and call your doctor if you are having problems. It's also a good idea to know your test results and keep a list of the medicines you take. How can you care for yourself at home? General care  · Take your medicines exactly as prescribed. Call your doctor if you think you are having a problem with your medicine. · Make sure your home is safe:  ¨ Place furniture so that you have something to hold on to as you walk around the house. ¨ Use chairs that make it easier to sit down and stand up. ¨ Group the things you use most, such as reading glasses, keys, and the telephone, in one easy-to-reach place. ¨ Tack down rugs so that you do not trip. ¨ Put no-slip tape and handrails in the tub to prevent falls. · Use a cane, walker, or scooter if your doctor suggests it. · Keep up your normal activities as much as you can. · Find ways to manage stress, which can make symptoms worse. · Spend time with family and friends. Join a support group for people with Parkinson's disease if you want extra help. · Depression is common with this condition. Tell your doctor if you have trouble sleeping, are eating too much or are not hungry, or feel sad or tearful all the time.  Depression can be treated with medicine and medical condition or this instruction, always ask your healthcare professional. [place] [address] any warranty or liability for your use of this information. Patient Education          carbidopa and levodopa  Pronunciation:  AZEB silva and TATO silva  Brand:  Parcopa, Rytary, Sinemet, Sinemet CR  What is the most important information I should know about carbidopa and levodopa? You should not use carbidopa and levodopa if you have narrow-angle glaucoma. Do not use carbidopa and levodopa if you have used an MAO inhibitor in the past 14 days. A dangerous drug interaction could occur. MAO inhibitors include isocarboxazid, linezolid, methylene blue injection, phenelzine, rasagiline, selegiline, tranylcypromine, and others. What is carbidopa and levodopa? Carbidopa and levodopa is a combination medicine used to treat symptoms of Parkinson's disease, such as muscle stiffness, tremors, spasms, and poor muscle control. Parkinson's disease may be caused by low levels of a chemical called dopamine (DICK rosas) in the brain. Levodopa is converted to dopamine in the brain. Carbidopa helps prevent the breakdown of levodopa before it can reach the brain and take effect. Carbidopa and levodopa is also used to treat Parkinson symptoms caused by carbon monoxide poisoning or manganese intoxication. Carbidopa and levodopa may also be used for purposes not listed in this medication guide. What should I discuss with my healthcare provider before taking carbidopa and levodopa? You should not use carbidopa and levodopa if you are allergic to it, or if you have:  · narrow-angle glaucoma. Do not use carbidopa and levodopa if you have used an MAO inhibitor in the past 14 days. A dangerous drug interaction could occur. MAO inhibitors include isocarboxazid, linezolid, methylene blue injection, phenelzine, rasagiline, selegiline, tranylcypromine, and others.   To make sure carbidopa and levodopa is safe

## 2019-01-01 ENCOUNTER — HOSPITAL ENCOUNTER (OUTPATIENT)
Age: 84
Discharge: HOME | End: 2019-11-22
Payer: MEDICARE

## 2019-01-01 ENCOUNTER — HOSPITAL ENCOUNTER (OUTPATIENT)
Age: 84
Discharge: HOME | End: 2019-10-13
Payer: MEDICARE

## 2019-01-01 LAB
ALBUMIN SERPL-MCNC: 3.1 G/DL (ref 3.5–5.2)
ALP BLD-CCNC: 59 U/L (ref 35–104)
ALT SERPL-CCNC: 7 U/L (ref 0–32)
AMORPHOUS: ABNORMAL
ANION GAP SERPL CALCULATED.3IONS-SCNC: 10 MMOL/L (ref 7–16)
AST SERPL-CCNC: 11 U/L (ref 0–31)
BACTERIA: ABNORMAL /HPF
BILIRUB SERPL-MCNC: 0.3 MG/DL (ref 0–1.2)
BILIRUBIN URINE: NEGATIVE
BLOOD, URINE: ABNORMAL
BUN BLDV-MCNC: 21 MG/DL (ref 8–23)
CALCIUM SERPL-MCNC: 8.8 MG/DL (ref 8.6–10.2)
CHLORIDE BLD-SCNC: 106 MMOL/L (ref 98–107)
CLARITY: CLEAR
CO2: 25 MMOL/L (ref 22–29)
COLOR: YELLOW
CREAT SERPL-MCNC: 0.5 MG/DL (ref 0.5–1)
CRYSTALS, UA: ABNORMAL
EPITHELIAL CELLS, UA: ABNORMAL /HPF
FOLATE: 8.6 NG/ML (ref 4.8–24.2)
GFR AFRICAN AMERICAN: >60
GFR NON-AFRICAN AMERICAN: >60 ML/MIN/1.73
GLUCOSE BLD-MCNC: 83 MG/DL (ref 74–99)
GLUCOSE URINE: NEGATIVE MG/DL
HCT VFR BLD CALC: 36.9 % (ref 34–48)
HEMOGLOBIN: 11.6 G/DL (ref 11.5–15.5)
KETONES, URINE: ABNORMAL MG/DL
LEUKOCYTE ESTERASE, URINE: ABNORMAL
MCH RBC QN AUTO: 30.7 PG (ref 26–35)
MCHC RBC AUTO-ENTMCNC: 31.4 % (ref 32–34.5)
MCV RBC AUTO: 97.6 FL (ref 80–99.9)
NITRITE, URINE: POSITIVE
ORGANISM: ABNORMAL
PDW BLD-RTO: 13.3 FL (ref 11.5–15)
PH UA: 6 (ref 5–9)
PLATELET # BLD: 162 E9/L (ref 130–450)
PMV BLD AUTO: 10.3 FL (ref 7–12)
POTASSIUM SERPL-SCNC: 4.5 MMOL/L (ref 3.5–5)
PROTEIN UA: NEGATIVE MG/DL
RBC # BLD: 3.78 E12/L (ref 3.5–5.5)
RBC UA: ABNORMAL /HPF (ref 0–2)
SODIUM BLD-SCNC: 141 MMOL/L (ref 132–146)
SPECIFIC GRAVITY UA: 1.02 (ref 1–1.03)
TOTAL PROTEIN: 6.1 G/DL (ref 6.4–8.3)
URINE CULTURE, ROUTINE: ABNORMAL
UROBILINOGEN, URINE: 0.2 E.U./DL
VITAMIN B-12: 616 PG/ML (ref 211–946)
VITAMIN D 25-HYDROXY: 36 NG/ML (ref 30–100)
WBC # BLD: 6.1 E9/L (ref 4.5–11.5)
WBC UA: >20 /HPF (ref 0–5)

## 2019-01-01 PROCEDURE — 82746 ASSAY OF FOLIC ACID SERUM: CPT

## 2019-01-01 PROCEDURE — 87088 URINE BACTERIA CULTURE: CPT

## 2019-01-01 PROCEDURE — 87186 SC STD MICRODIL/AGAR DIL: CPT

## 2019-01-01 PROCEDURE — 85027 COMPLETE CBC AUTOMATED: CPT

## 2019-01-01 PROCEDURE — 81001 URINALYSIS AUTO W/SCOPE: CPT

## 2019-01-01 PROCEDURE — 82607 VITAMIN B-12: CPT

## 2019-01-01 PROCEDURE — 36415 COLL VENOUS BLD VENIPUNCTURE: CPT

## 2019-01-01 PROCEDURE — 80053 COMPREHEN METABOLIC PANEL: CPT

## 2019-01-01 PROCEDURE — 82306 VITAMIN D 25 HYDROXY: CPT

## 2019-02-19 ENCOUNTER — OFFICE VISIT (OUTPATIENT)
Dept: NEUROLOGY | Age: 84
End: 2019-02-19
Payer: MEDICARE

## 2019-02-19 ENCOUNTER — TELEPHONE (OUTPATIENT)
Dept: NEUROLOGY | Age: 84
End: 2019-02-19

## 2019-02-19 VITALS
RESPIRATION RATE: 12 BRPM | SYSTOLIC BLOOD PRESSURE: 131 MMHG | WEIGHT: 110 LBS | BODY MASS INDEX: 21.48 KG/M2 | DIASTOLIC BLOOD PRESSURE: 69 MMHG | TEMPERATURE: 97.4 F | HEART RATE: 78 BPM | OXYGEN SATURATION: 96 %

## 2019-02-19 DIAGNOSIS — G20 PARKINSON'S DISEASE (HCC): ICD-10-CM

## 2019-02-19 PROCEDURE — 99213 OFFICE O/P EST LOW 20 MIN: CPT | Performed by: NURSE PRACTITIONER

## 2019-02-19 RX ORDER — LANOLIN ALCOHOL/MO/W.PET/CERES
1000 CREAM (GRAM) TOPICAL DAILY
COMMUNITY

## 2019-02-21 ENCOUNTER — HOSPITAL ENCOUNTER (OUTPATIENT)
Dept: CT IMAGING | Age: 84
Discharge: HOME | End: 2019-02-23
Payer: MEDICARE

## 2019-02-21 DIAGNOSIS — M25.551 RIGHT HIP PAIN: ICD-10-CM

## 2019-02-21 PROCEDURE — 73700 CT LOWER EXTREMITY W/O DYE: CPT

## 2019-02-28 ENCOUNTER — TELEPHONE (OUTPATIENT)
Dept: NEUROLOGY | Age: 84
End: 2019-02-28

## 2019-05-20 ENCOUNTER — TELEPHONE (OUTPATIENT)
Dept: NEUROLOGY | Age: 84
End: 2019-05-20

## 2019-05-28 DIAGNOSIS — G20 PARKINSON'S DISEASE (HCC): ICD-10-CM

## 2019-08-02 ENCOUNTER — HOSPITAL ENCOUNTER (OUTPATIENT)
Age: 84
Discharge: HOME | End: 2019-08-04
Payer: MEDICARE

## 2019-08-02 PROCEDURE — 36415 COLL VENOUS BLD VENIPUNCTURE: CPT

## 2019-08-02 PROCEDURE — 86481 TB AG RESPONSE T-CELL SUSP: CPT

## 2019-08-07 LAB
COMMENT: NORMAL
REPORT: NORMAL

## 2020-01-01 ENCOUNTER — HOSPITAL ENCOUNTER (OUTPATIENT)
Age: 85
Discharge: HOME | End: 2020-06-07
Payer: MEDICARE

## 2020-01-01 ENCOUNTER — HOSPITAL ENCOUNTER (OUTPATIENT)
Age: 85
Discharge: HOME | End: 2020-04-06
Payer: MEDICARE

## 2020-01-01 ENCOUNTER — HOSPITAL ENCOUNTER (OUTPATIENT)
Age: 85
Discharge: HOME | End: 2020-06-17
Payer: MEDICARE

## 2020-01-01 ENCOUNTER — HOSPITAL ENCOUNTER (OUTPATIENT)
Age: 85
Discharge: HOME | End: 2020-09-10
Payer: MEDICARE

## 2020-01-01 ENCOUNTER — CLINICAL DOCUMENTATION (OUTPATIENT)
Dept: FAMILY MEDICINE CLINIC | Age: 85
End: 2020-01-01

## 2020-01-01 ENCOUNTER — HOSPITAL ENCOUNTER (OUTPATIENT)
Age: 85
Discharge: HOME | End: 2020-05-02
Payer: MEDICARE

## 2020-01-01 ENCOUNTER — HOSPITAL ENCOUNTER (OUTPATIENT)
Age: 85
Discharge: HOME | End: 2020-02-28
Payer: MEDICARE

## 2020-01-01 ENCOUNTER — HOSPITAL ENCOUNTER (OUTPATIENT)
Age: 85
Discharge: HOME | End: 2020-07-09
Payer: MEDICARE

## 2020-01-01 ENCOUNTER — HOSPITAL ENCOUNTER (OUTPATIENT)
Age: 85
Discharge: HOME | End: 2020-09-02
Payer: MEDICARE

## 2020-01-01 ENCOUNTER — HOSPITAL ENCOUNTER (OUTPATIENT)
Age: 85
Discharge: HOME | End: 2020-07-15
Payer: MEDICARE

## 2020-01-01 ENCOUNTER — HOSPITAL ENCOUNTER (OUTPATIENT)
Dept: AUDIOLOGY | Age: 85
Discharge: HOME | End: 2020-03-09
Payer: MEDICARE

## 2020-01-01 ENCOUNTER — HOSPITAL ENCOUNTER (OUTPATIENT)
Age: 85
Discharge: HOME | End: 2020-08-14
Payer: MEDICARE

## 2020-01-01 ENCOUNTER — HOSPITAL ENCOUNTER (OUTPATIENT)
Age: 85
Discharge: HOME | End: 2020-05-13
Payer: MEDICARE

## 2020-01-01 ENCOUNTER — HOSPITAL ENCOUNTER (OUTPATIENT)
Age: 85
Discharge: HOME | End: 2020-08-05
Payer: MEDICARE

## 2020-01-01 ENCOUNTER — HOSPITAL ENCOUNTER (OUTPATIENT)
Age: 85
Discharge: HOME | End: 2020-01-26
Payer: MEDICARE

## 2020-01-01 ENCOUNTER — HOSPITAL ENCOUNTER (OUTPATIENT)
Age: 85
Discharge: HOME | End: 2020-09-16
Payer: MEDICARE

## 2020-01-01 ENCOUNTER — HOSPITAL ENCOUNTER (OUTPATIENT)
Age: 85
Discharge: HOME | End: 2020-07-02
Payer: MEDICARE

## 2020-01-01 ENCOUNTER — HOSPITAL ENCOUNTER (OUTPATIENT)
Age: 85
Discharge: HOME | End: 2020-07-22
Payer: MEDICARE

## 2020-01-01 ENCOUNTER — HOSPITAL ENCOUNTER (OUTPATIENT)
Age: 85
Discharge: HOME | End: 2020-06-24
Payer: MEDICARE

## 2020-01-01 ENCOUNTER — HOSPITAL ENCOUNTER (OUTPATIENT)
Age: 85
Discharge: HOME | End: 2020-08-20
Payer: MEDICARE

## 2020-01-01 LAB
ADENOVIRUS BY PCR: NOT DETECTED
BACTERIA: ABNORMAL /HPF
BILIRUBIN URINE: NEGATIVE
BLOOD, URINE: ABNORMAL
BORDETELLA PARAPERTUSSIS BY PCR: NOT DETECTED
BORDETELLA PERTUSSIS BY PCR: NOT DETECTED
CHLAMYDOPHILIA PNEUMONIAE BY PCR: NOT DETECTED
CLARITY: ABNORMAL
COLOR: YELLOW
CORONAVIRUS 229E BY PCR: NOT DETECTED
CORONAVIRUS HKU1 BY PCR: NOT DETECTED
CORONAVIRUS NL63 BY PCR: NOT DETECTED
CORONAVIRUS OC43 BY PCR: NOT DETECTED
GLUCOSE URINE: NEGATIVE MG/DL
HUMAN METAPNEUMOVIRUS BY PCR: NOT DETECTED
HUMAN RHINOVIRUS/ENTEROVIRUS BY PCR: NOT DETECTED
INFLUENZA A BY PCR: NOT DETECTED
INFLUENZA B BY PCR: NOT DETECTED
KETONES, URINE: NEGATIVE MG/DL
LEUKOCYTE ESTERASE, URINE: ABNORMAL
MYCOPLASMA PNEUMONIAE BY PCR: NOT DETECTED
NITRITE, URINE: POSITIVE
ORGANISM: ABNORMAL
PARAINFLUENZA VIRUS 1 BY PCR: NOT DETECTED
PARAINFLUENZA VIRUS 2 BY PCR: NOT DETECTED
PARAINFLUENZA VIRUS 3 BY PCR: NOT DETECTED
PARAINFLUENZA VIRUS 4 BY PCR: NOT DETECTED
PH UA: 6 (ref 5–9)
PROTEIN UA: ABNORMAL MG/DL
RBC UA: ABNORMAL /HPF (ref 0–2)
RESPIRATORY SYNCYTIAL VIRUS BY PCR: NOT DETECTED
SARS-COV-2: NOT DETECTED
SOURCE: NORMAL
SPECIFIC GRAVITY UA: >=1.03 (ref 1–1.03)
URINE CULTURE, ROUTINE: ABNORMAL
URINE CULTURE, ROUTINE: NORMAL
UROBILINOGEN, URINE: 0.2 E.U./DL
WBC UA: >20 /HPF (ref 0–5)

## 2020-01-01 PROCEDURE — U0003 INFECTIOUS AGENT DETECTION BY NUCLEIC ACID (DNA OR RNA); SEVERE ACUTE RESPIRATORY SYNDROME CORONAVIRUS 2 (SARS-COV-2) (CORONAVIRUS DISEASE [COVID-19]), AMPLIFIED PROBE TECHNIQUE, MAKING USE OF HIGH THROUGHPUT TECHNOLOGIES AS DESCRIBED BY CMS-2020-01-R: HCPCS

## 2020-01-01 PROCEDURE — 87186 SC STD MICRODIL/AGAR DIL: CPT

## 2020-01-01 PROCEDURE — 9990000010 HC NO CHARGE VISIT: Performed by: AUDIOLOGIST

## 2020-01-01 PROCEDURE — 87088 URINE BACTERIA CULTURE: CPT

## 2020-01-01 PROCEDURE — 81001 URINALYSIS AUTO W/SCOPE: CPT

## 2020-01-01 PROCEDURE — 0100U HC RESPIRPTHGN MULT REV TRANS & AMP PRB TECH 21 TRGT: CPT

## 2020-06-29 NOTE — PROGRESS NOTES
Irais Reyes is a 80 y.o. female. HPI/Chief C/O:  No chief complaint on file. Allergies   Allergen Reactions    Sulfa Antibiotics Hives, Shortness Of Breath and Other (See Comments)     Throat closes shut    Clindamycin/Lincomycin     Lactose Intolerance (Gi)     Macrolides And Ketolides     Penicillins      This patient is new to me and I will do a total review   We will review all past medical history and go over past and current medications   We will review all medical records that are available to us  We will reconcile our care planning and treatment goals to past and present for this patient         ROS:  Review of Systems   Unable to perform ROS: Dementia        Past Medical/Surgical Hx;  Reviewed with patient      Diagnosis Date    Allergy history, drug     patient very sensitive to all medications    Arthritis     rheumatoid at both knees since childhood    Autoimmune disease ([facility] [address]     sojourns disease, c/o dry mouth and eyes    Autoimmune disorder (HCC)     reynaulds,at hands/feet    Chronic back pain     Dementia     [place] (hard of hearing)     uses aide bilateral    Nausea & vomiting     extreme nausea    Raynaud's disease     Rheumatic fever     as a child, , no definite diagnisis of cardiac complications    Wears dentures     wears a tempory  partial top     Past Surgical History:   Procedure Laterality Date    CYST REMOVAL  1950's    from back and from fingers    EYE SURGERY  11/12    right cataract extraction with IOL implant    EYE SURGERY  1/10/2013    left cataract extraction with IOL implant    JOINT REPLACEMENT  x3, last one 2000    left hip, has had revisions    REVISION TOTAL HIP ARTHROPLASTY      STAPEDES SURGERY  1960's    not sure of entire surgery done    TONSILLECTOMY      TOTAL HIP ARTHROPLASTY         Past Family Hx:  Reviewed with patient  No family history on file.     Social Hx:  Reviewed with patient  Social History     Tobacco Use    Smoking

## 2020-09-17 PROBLEM — F02.80 LATE ONSET ALZHEIMER'S DISEASE WITHOUT BEHAVIORAL DISTURBANCE (HCC): Status: ACTIVE | Noted: 2020-01-01

## 2020-09-17 PROBLEM — G30.1 LATE ONSET ALZHEIMER'S DISEASE WITHOUT BEHAVIORAL DISTURBANCE (HCC): Status: ACTIVE | Noted: 2020-01-01

## 2020-09-17 NOTE — PROGRESS NOTES
Vicenta Donnelly is a 80 y.o. female. HPI/Chief C/O:  No chief complaint on file. Allergies   Allergen Reactions    Sulfa Antibiotics Hives, Shortness Of Breath and Other (See Comments)     Throat closes shut    Clindamycin/Lincomycin     Lactose Intolerance (Gi)     Macrolides And Ketolides     Penicillins    I am in to assess this end stage patient   We will evaluate for comfort care and adjust as needed  There is no new issue brought by nursing         ROS:  Review of Systems   Unable to perform ROS: Dementia        Past Medical/Surgical Hx;  Reviewed with patient      Diagnosis Date    Allergy history, drug     patient very sensitive to all medications    Arthritis     rheumatoid at both knees since childhood    Autoimmune disease ([facility] [address]     sojourns disease, c/o dry mouth and eyes    Autoimmune disorder (HCC)     reynaulds,at hands/feet    Chronic back pain     Dementia     [place] (hard of hearing)     uses aide bilateral    Nausea & vomiting     extreme nausea    Raynaud's disease     Rheumatic fever     as a child, , no definite diagnisis of cardiac complications    Wears dentures     wears a tempory  partial top     Past Surgical History:   Procedure Laterality Date    CYST REMOVAL  1950's    from back and from fingers    EYE SURGERY  11/12    right cataract extraction with IOL implant    EYE SURGERY  1/10/2013    left cataract extraction with IOL implant    JOINT REPLACEMENT  x3, last one 2000    left hip, has had revisions    REVISION TOTAL HIP ARTHROPLASTY      STAPEDES SURGERY  1960's    not sure of entire surgery done    TONSILLECTOMY      TOTAL HIP ARTHROPLASTY         Past Family Hx:  Reviewed with patient  No family history on file.     Social Hx:  Reviewed with patient  Social History     Tobacco Use    Smoking status: Never Smoker    Smokeless tobacco: Never Used   Substance Use Topics    Alcohol use: Yes     Comment: 1 glass wine 1-2 x/year OBJECTIVE  There were no vitals taken for this visit. Problem List:  Evelyne Smith does not have any pertinent problems on file. PHYS EX:  Physical Exam  Vitals signs and nursing note reviewed. Constitutional:       General: She is not in acute distress. Appearance: She is well-developed. She is ill-appearing. She is not toxic-appearing or diaphoretic. HENT:      Head: Normocephalic and atraumatic. Nose: Nose normal.      Mouth/Throat:      Mouth: Mucous membranes are moist.      Pharynx: No oropharyngeal exudate or posterior oropharyngeal erythema. Neck:      Musculoskeletal: No neck rigidity or muscular tenderness. Thyroid: No thyromegaly. Vascular: No carotid bruit or JVD. Trachea: No tracheal deviation. Cardiovascular:      Rate and Rhythm: Normal rate and regular rhythm. Pulses: Normal pulses. Heart sounds: Normal heart sounds. No murmur. No friction rub. No gallop. Pulmonary:      Effort: Pulmonary effort is normal. No respiratory distress. Breath sounds: Normal breath sounds. No stridor. No wheezing, rhonchi or rales. Chest:      Chest wall: No tenderness. Abdominal:      General: Bowel sounds are normal. There is no distension. Palpations: Abdomen is soft. There is no mass. Tenderness: There is no abdominal tenderness. There is no right CVA tenderness, left CVA tenderness, guarding or rebound. Hernia: No hernia is present. Musculoskeletal:         General: No swelling, tenderness, deformity or signs of injury. Right lower leg: No edema. Left lower leg: No edema. Lymphadenopathy:      Cervical: No cervical adenopathy. Neurological:      Motor: No abnormal muscle tone. Deep Tendon Reflexes: Reflexes are normal and symmetric.       Comments: She is unresponsive to me         ASSESSMENT/PLAN  Diagnoses and all orders for this visit:    Parkinsonism, unspecified Parkinsonism type ([facility] [address]    Cervical stenosis of spinal canal    Status post total replacement of hip, unspecified laterality    Generalized weakness    Late onset Alzheimer's disease without behavioral disturbance ([facility] [address]    This patient is end stage and on Hospice care  She did not awake during my entire exam but appears to be breathing with comfort and is in not apparent pain at present  My intention is to assess her comfort care and adjust for this as needed  We will follow hospice advice     Outpatient Encounter Medications as of 9/17/2020   Medication Sig Dispense Refill    carbidopa-levodopa (SINEMET)  MG per tablet Take 1 tablet by mouth 2 times daily 60 tablet 2    vitamin B-12 (CYANOCOBALAMIN) 1000 MCG tablet Take 1,000 mcg by mouth daily      meclizine (ANTIVERT) 25 MG tablet Take 0.5 tablets by mouth 3 times daily as needed for Dizziness 20 tablet 1    Cholecalciferol (VITAMIN D) 2000 units CAPS capsule Take 1 capsule by mouth daily      acetaminophen (TYLENOL) 325 MG tablet Take 1 tablet by mouth every 4 hours as needed for Pain 120 tablet 3    sertraline (ZOLOFT) 25 MG tablet Take 12.5 mg by mouth every other day  5    ALPRAZolam (XANAX) 0.25 MG tablet Take 0.25 mg by mouth 3 times daily as needed.  lidocaine (LIDODERM) 5 % Place 1 patch onto the skin as needed. 12 hours on, 12 hours off as needs for lower back pain        No facility-administered encounter medications on file as of 9/17/2020. No follow-ups on file.         Reviewed recent labs related to [facility] current problems      Discussed importance of regular Health Maintenance follow up  Health Maintenance   Topic    Shingles Vaccine (1 of 2)    Annual Wellness Visit (AWV)     Flu vaccine (1)    DTaP/Tdap/Td vaccine (3 - Td)    Pneumococcal 65+ years Vaccine     Hepatitis A vaccine     Hepatitis B vaccine     Hib vaccine     Meningococcal (ACWY) vaccine